# Patient Record
Sex: MALE | Race: WHITE | URBAN - METROPOLITAN AREA
[De-identification: names, ages, dates, MRNs, and addresses within clinical notes are randomized per-mention and may not be internally consistent; named-entity substitution may affect disease eponyms.]

---

## 2017-07-23 ENCOUNTER — HOSPITAL ENCOUNTER (INPATIENT)
Facility: CLINIC | Age: 37
LOS: 8 days | Discharge: HOME | DRG: 439 | End: 2017-07-31
Attending: EMERGENCY MEDICINE | Admitting: HOSPITALIST

## 2017-07-23 ENCOUNTER — APPOINTMENT (OUTPATIENT)
Dept: CT IMAGING | Facility: CLINIC | Age: 37
DRG: 439 | End: 2017-07-23
Attending: EMERGENCY MEDICINE

## 2017-07-23 ENCOUNTER — APPOINTMENT (OUTPATIENT)
Dept: ULTRASOUND IMAGING | Facility: CLINIC | Age: 37
DRG: 439 | End: 2017-07-23
Attending: EMERGENCY MEDICINE

## 2017-07-23 DIAGNOSIS — F41.1 GAD (GENERALIZED ANXIETY DISORDER): ICD-10-CM

## 2017-07-23 DIAGNOSIS — F43.22 ADJUSTMENT DISORDER WITH ANXIOUS MOOD: Primary | ICD-10-CM

## 2017-07-23 DIAGNOSIS — I10 ESSENTIAL HYPERTENSION, BENIGN: ICD-10-CM

## 2017-07-23 DIAGNOSIS — R10.13 ABDOMINAL PAIN, EPIGASTRIC: ICD-10-CM

## 2017-07-23 DIAGNOSIS — K59.03 DRUG-INDUCED CONSTIPATION: ICD-10-CM

## 2017-07-23 DIAGNOSIS — K85.90 ACUTE PANCREATITIS WITHOUT INFECTION OR NECROSIS, UNSPECIFIED PANCREATITIS TYPE: ICD-10-CM

## 2017-07-23 PROBLEM — K85.20 ACUTE ALCOHOLIC PANCREATITIS: Status: ACTIVE | Noted: 2017-07-23

## 2017-07-23 LAB
ALBUMIN SERPL-MCNC: 3.8 G/DL (ref 3.4–5)
ALP SERPL-CCNC: 151 U/L (ref 40–150)
ALT SERPL W P-5'-P-CCNC: 175 U/L (ref 0–70)
ANION GAP SERPL CALCULATED.3IONS-SCNC: 19 MMOL/L (ref 3–14)
ANION GAP SERPL CALCULATED.3IONS-SCNC: 9 MMOL/L (ref 3–14)
AST SERPL W P-5'-P-CCNC: 334 U/L (ref 0–45)
BILIRUB SERPL-MCNC: 0.9 MG/DL (ref 0.2–1.3)
BUN SERPL-MCNC: 12 MG/DL (ref 7–30)
BUN SERPL-MCNC: 9 MG/DL (ref 7–30)
CALCIUM SERPL-MCNC: 7.7 MG/DL (ref 8.5–10.1)
CALCIUM SERPL-MCNC: 8.3 MG/DL (ref 8.5–10.1)
CHLORIDE SERPL-SCNC: 100 MMOL/L (ref 94–109)
CHLORIDE SERPL-SCNC: 104 MMOL/L (ref 94–109)
CO2 SERPL-SCNC: 16 MMOL/L (ref 20–32)
CO2 SERPL-SCNC: 23 MMOL/L (ref 20–32)
CREAT SERPL-MCNC: 0.85 MG/DL (ref 0.66–1.25)
CREAT SERPL-MCNC: 0.9 MG/DL (ref 0.66–1.25)
ERYTHROCYTE [DISTWIDTH] IN BLOOD BY AUTOMATED COUNT: 14.3 % (ref 10–15)
ETHANOL SERPL-MCNC: 0.1 G/DL
GFR SERPL CREATININE-BSD FRML MDRD: ABNORMAL ML/MIN/1.7M2
GFR SERPL CREATININE-BSD FRML MDRD: ABNORMAL ML/MIN/1.7M2
GLUCOSE SERPL-MCNC: 134 MG/DL (ref 70–99)
GLUCOSE SERPL-MCNC: 135 MG/DL (ref 70–99)
HCT VFR BLD AUTO: 43.9 % (ref 40–53)
HGB BLD-MCNC: 15.7 G/DL (ref 13.3–17.7)
LACTATE BLD-SCNC: 1.3 MMOL/L (ref 0.7–2.1)
LACTATE BLD-SCNC: 1.6 MMOL/L (ref 0.7–2.1)
LIPASE SERPL-CCNC: 3648 U/L (ref 73–393)
MAGNESIUM SERPL-MCNC: 1.8 MG/DL (ref 1.6–2.3)
MCH RBC QN AUTO: 33 PG (ref 26.5–33)
MCHC RBC AUTO-ENTMCNC: 35.8 G/DL (ref 31.5–36.5)
MCV RBC AUTO: 92 FL (ref 78–100)
PLATELET # BLD AUTO: 196 10E9/L (ref 150–450)
POTASSIUM SERPL-SCNC: 3.6 MMOL/L (ref 3.4–5.3)
POTASSIUM SERPL-SCNC: 4.1 MMOL/L (ref 3.4–5.3)
PROT SERPL-MCNC: 7.9 G/DL (ref 6.8–8.8)
RBC # BLD AUTO: 4.76 10E12/L (ref 4.4–5.9)
SODIUM SERPL-SCNC: 135 MMOL/L (ref 133–144)
SODIUM SERPL-SCNC: 136 MMOL/L (ref 133–144)
WBC # BLD AUTO: 13.8 10E9/L (ref 4–11)

## 2017-07-23 PROCEDURE — 25000128 H RX IP 250 OP 636: Performed by: INTERNAL MEDICINE

## 2017-07-23 PROCEDURE — 25000125 ZZHC RX 250: Performed by: INTERNAL MEDICINE

## 2017-07-23 PROCEDURE — 25000128 H RX IP 250 OP 636: Performed by: HOSPITALIST

## 2017-07-23 PROCEDURE — 76705 ECHO EXAM OF ABDOMEN: CPT

## 2017-07-23 PROCEDURE — 80320 DRUG SCREEN QUANTALCOHOLS: CPT | Performed by: EMERGENCY MEDICINE

## 2017-07-23 PROCEDURE — 83735 ASSAY OF MAGNESIUM: CPT | Performed by: HOSPITALIST

## 2017-07-23 PROCEDURE — 85027 COMPLETE CBC AUTOMATED: CPT | Performed by: EMERGENCY MEDICINE

## 2017-07-23 PROCEDURE — 36415 COLL VENOUS BLD VENIPUNCTURE: CPT | Performed by: HOSPITALIST

## 2017-07-23 PROCEDURE — 83735 ASSAY OF MAGNESIUM: CPT | Performed by: EMERGENCY MEDICINE

## 2017-07-23 PROCEDURE — 99285 EMERGENCY DEPT VISIT HI MDM: CPT | Mod: 25

## 2017-07-23 PROCEDURE — 96374 THER/PROPH/DIAG INJ IV PUSH: CPT

## 2017-07-23 PROCEDURE — 25000125 ZZHC RX 250: Performed by: HOSPITALIST

## 2017-07-23 PROCEDURE — 80048 BASIC METABOLIC PNL TOTAL CA: CPT | Performed by: HOSPITALIST

## 2017-07-23 PROCEDURE — 99223 1ST HOSP IP/OBS HIGH 75: CPT | Mod: AI | Performed by: HOSPITALIST

## 2017-07-23 PROCEDURE — 12000000 ZZH R&B MED SURG/OB

## 2017-07-23 PROCEDURE — 80053 COMPREHEN METABOLIC PANEL: CPT | Performed by: EMERGENCY MEDICINE

## 2017-07-23 PROCEDURE — HZ2ZZZZ DETOXIFICATION SERVICES FOR SUBSTANCE ABUSE TREATMENT: ICD-10-PCS | Performed by: EMERGENCY MEDICINE

## 2017-07-23 PROCEDURE — 83605 ASSAY OF LACTIC ACID: CPT | Performed by: HOSPITALIST

## 2017-07-23 PROCEDURE — 83690 ASSAY OF LIPASE: CPT | Performed by: EMERGENCY MEDICINE

## 2017-07-23 PROCEDURE — 74177 CT ABD & PELVIS W/CONTRAST: CPT

## 2017-07-23 PROCEDURE — 96361 HYDRATE IV INFUSION ADD-ON: CPT

## 2017-07-23 PROCEDURE — 96375 TX/PRO/DX INJ NEW DRUG ADDON: CPT

## 2017-07-23 PROCEDURE — S0028 INJECTION, FAMOTIDINE, 20 MG: HCPCS | Performed by: HOSPITALIST

## 2017-07-23 PROCEDURE — 25000128 H RX IP 250 OP 636: Performed by: EMERGENCY MEDICINE

## 2017-07-23 PROCEDURE — 93005 ELECTROCARDIOGRAM TRACING: CPT

## 2017-07-23 PROCEDURE — 96376 TX/PRO/DX INJ SAME DRUG ADON: CPT

## 2017-07-23 RX ORDER — IBUPROFEN 200 MG
200 TABLET ORAL EVERY 4 HOURS PRN
Status: ON HOLD | COMMUNITY
End: 2017-07-31

## 2017-07-23 RX ORDER — NALOXONE HYDROCHLORIDE 0.4 MG/ML
.1-.4 INJECTION, SOLUTION INTRAMUSCULAR; INTRAVENOUS; SUBCUTANEOUS
Status: DISCONTINUED | OUTPATIENT
Start: 2017-07-23 | End: 2017-07-27

## 2017-07-23 RX ORDER — POTASSIUM CHLORIDE 1.5 G/1.58G
20-40 POWDER, FOR SOLUTION ORAL
Status: DISCONTINUED | OUTPATIENT
Start: 2017-07-23 | End: 2017-07-31 | Stop reason: HOSPADM

## 2017-07-23 RX ORDER — ONDANSETRON 2 MG/ML
8 INJECTION INTRAMUSCULAR; INTRAVENOUS ONCE
Status: COMPLETED | OUTPATIENT
Start: 2017-07-23 | End: 2017-07-23

## 2017-07-23 RX ORDER — HALOPERIDOL 5 MG/ML
5 INJECTION INTRAMUSCULAR ONCE
Status: COMPLETED | OUTPATIENT
Start: 2017-07-23 | End: 2017-07-23

## 2017-07-23 RX ORDER — ONDANSETRON 4 MG/1
4 TABLET, ORALLY DISINTEGRATING ORAL EVERY 6 HOURS PRN
Status: DISCONTINUED | OUTPATIENT
Start: 2017-07-23 | End: 2017-07-23

## 2017-07-23 RX ORDER — LORAZEPAM 1 MG/1
1-2 TABLET ORAL EVERY 30 MIN PRN
Status: DISCONTINUED | OUTPATIENT
Start: 2017-07-23 | End: 2017-07-26

## 2017-07-23 RX ORDER — MAGNESIUM SULFATE HEPTAHYDRATE 40 MG/ML
4 INJECTION, SOLUTION INTRAVENOUS EVERY 4 HOURS PRN
Status: DISCONTINUED | OUTPATIENT
Start: 2017-07-23 | End: 2017-07-31 | Stop reason: HOSPADM

## 2017-07-23 RX ORDER — HYDROMORPHONE HYDROCHLORIDE 1 MG/ML
0.5 INJECTION, SOLUTION INTRAMUSCULAR; INTRAVENOUS; SUBCUTANEOUS
Status: DISCONTINUED | OUTPATIENT
Start: 2017-07-23 | End: 2017-07-23

## 2017-07-23 RX ORDER — ONDANSETRON 2 MG/ML
4 INJECTION INTRAMUSCULAR; INTRAVENOUS EVERY 6 HOURS PRN
Status: DISCONTINUED | OUTPATIENT
Start: 2017-07-23 | End: 2017-07-23

## 2017-07-23 RX ORDER — LORAZEPAM 2 MG/ML
1-2 INJECTION INTRAMUSCULAR EVERY 30 MIN PRN
Status: DISCONTINUED | OUTPATIENT
Start: 2017-07-23 | End: 2017-07-26

## 2017-07-23 RX ORDER — DIPHENHYDRAMINE HYDROCHLORIDE 50 MG/ML
25 INJECTION INTRAMUSCULAR; INTRAVENOUS EVERY 6 HOURS PRN
Status: DISCONTINUED | OUTPATIENT
Start: 2017-07-23 | End: 2017-07-31 | Stop reason: HOSPADM

## 2017-07-23 RX ORDER — ONDANSETRON 2 MG/ML
4 INJECTION INTRAMUSCULAR; INTRAVENOUS EVERY 6 HOURS PRN
Status: DISCONTINUED | OUTPATIENT
Start: 2017-07-23 | End: 2017-07-27

## 2017-07-23 RX ORDER — POTASSIUM CL/LIDO/0.9 % NACL 10MEQ/0.1L
10 INTRAVENOUS SOLUTION, PIGGYBACK (ML) INTRAVENOUS
Status: DISCONTINUED | OUTPATIENT
Start: 2017-07-23 | End: 2017-07-31 | Stop reason: HOSPADM

## 2017-07-23 RX ORDER — KETAMINE HYDROCHLORIDE 10 MG/ML
9 INJECTION, SOLUTION INTRAMUSCULAR; INTRAVENOUS ONCE
Status: DISCONTINUED | OUTPATIENT
Start: 2017-07-23 | End: 2017-07-23

## 2017-07-23 RX ORDER — POTASSIUM CHLORIDE 29.8 MG/ML
20 INJECTION INTRAVENOUS
Status: DISCONTINUED | OUTPATIENT
Start: 2017-07-23 | End: 2017-07-31 | Stop reason: HOSPADM

## 2017-07-23 RX ORDER — ONDANSETRON 4 MG/1
4 TABLET, ORALLY DISINTEGRATING ORAL EVERY 6 HOURS PRN
Status: DISCONTINUED | OUTPATIENT
Start: 2017-07-23 | End: 2017-07-27

## 2017-07-23 RX ORDER — POTASSIUM CHLORIDE 1500 MG/1
20-40 TABLET, EXTENDED RELEASE ORAL
Status: DISCONTINUED | OUTPATIENT
Start: 2017-07-23 | End: 2017-07-31 | Stop reason: HOSPADM

## 2017-07-23 RX ORDER — POTASSIUM CHLORIDE 7.45 MG/ML
10 INJECTION INTRAVENOUS
Status: DISCONTINUED | OUTPATIENT
Start: 2017-07-23 | End: 2017-07-31 | Stop reason: HOSPADM

## 2017-07-23 RX ORDER — SODIUM CHLORIDE, SODIUM LACTATE, POTASSIUM CHLORIDE, CALCIUM CHLORIDE 600; 310; 30; 20 MG/100ML; MG/100ML; MG/100ML; MG/100ML
INJECTION, SOLUTION INTRAVENOUS CONTINUOUS
Status: ACTIVE | OUTPATIENT
Start: 2017-07-23 | End: 2017-07-24

## 2017-07-23 RX ORDER — DIPHENHYDRAMINE HCL 25 MG
25 CAPSULE ORAL EVERY 6 HOURS PRN
Status: DISCONTINUED | OUTPATIENT
Start: 2017-07-23 | End: 2017-07-31 | Stop reason: HOSPADM

## 2017-07-23 RX ORDER — NALOXONE HYDROCHLORIDE 0.4 MG/ML
.1-.4 INJECTION, SOLUTION INTRAMUSCULAR; INTRAVENOUS; SUBCUTANEOUS
Status: DISCONTINUED | OUTPATIENT
Start: 2017-07-23 | End: 2017-07-23

## 2017-07-23 RX ORDER — IOPAMIDOL 755 MG/ML
500 INJECTION, SOLUTION INTRAVASCULAR ONCE
Status: COMPLETED | OUTPATIENT
Start: 2017-07-23 | End: 2017-07-23

## 2017-07-23 RX ADMIN — CALCIUM CHLORIDE 1 G: 100 INJECTION, SOLUTION INTRAVENOUS at 21:54

## 2017-07-23 RX ADMIN — HYDROMORPHONE HYDROCHLORIDE 1 MG: 1 INJECTION, SOLUTION INTRAMUSCULAR; INTRAVENOUS; SUBCUTANEOUS at 08:59

## 2017-07-23 RX ADMIN — Medication 0.5 MG: at 10:24

## 2017-07-23 RX ADMIN — IOPAMIDOL 100 ML: 755 INJECTION, SOLUTION INTRAVENOUS at 10:58

## 2017-07-23 RX ADMIN — HALOPERIDOL LACTATE 5 MG: 5 INJECTION, SOLUTION INTRAMUSCULAR at 09:36

## 2017-07-23 RX ADMIN — SODIUM CHLORIDE, POTASSIUM CHLORIDE, SODIUM LACTATE AND CALCIUM CHLORIDE: 600; 310; 30; 20 INJECTION, SOLUTION INTRAVENOUS at 16:18

## 2017-07-23 RX ADMIN — SODIUM CHLORIDE 1000 ML: 9 INJECTION, SOLUTION INTRAVENOUS at 21:54

## 2017-07-23 RX ADMIN — Medication 0.5 MG: at 13:03

## 2017-07-23 RX ADMIN — FAMOTIDINE 20 MG: 10 INJECTION, SOLUTION INTRAVENOUS at 16:18

## 2017-07-23 RX ADMIN — SODIUM CHLORIDE, POTASSIUM CHLORIDE, SODIUM LACTATE AND CALCIUM CHLORIDE: 600; 310; 30; 20 INJECTION, SOLUTION INTRAVENOUS at 22:55

## 2017-07-23 RX ADMIN — SODIUM CHLORIDE 65 ML: 9 INJECTION, SOLUTION INTRAVENOUS at 10:58

## 2017-07-23 RX ADMIN — SODIUM CHLORIDE, POTASSIUM CHLORIDE, SODIUM LACTATE AND CALCIUM CHLORIDE: 600; 310; 30; 20 INJECTION, SOLUTION INTRAVENOUS at 20:24

## 2017-07-23 RX ADMIN — SODIUM CHLORIDE 1000 ML: 9 INJECTION, SOLUTION INTRAVENOUS at 08:59

## 2017-07-23 RX ADMIN — HYDROMORPHONE HYDROCHLORIDE: 10 INJECTION, SOLUTION INTRAMUSCULAR; INTRAVENOUS; SUBCUTANEOUS at 15:11

## 2017-07-23 RX ADMIN — ONDANSETRON 8 MG: 2 INJECTION INTRAMUSCULAR; INTRAVENOUS at 08:59

## 2017-07-23 ASSESSMENT — ACTIVITIES OF DAILY LIVING (ADL)
AMBULATION: 0-->INDEPENDENT
SWALLOWING: 0-->SWALLOWS FOODS/LIQUIDS WITHOUT DIFFICULTY
FALL_HISTORY_WITHIN_LAST_SIX_MONTHS: NO
COGNITION: 0 - NO COGNITION ISSUES REPORTED
TRANSFERRING: 0-->INDEPENDENT
TOILETING: 0-->INDEPENDENT
BATHING: 0-->INDEPENDENT
DRESS: 0-->INDEPENDENT
RETIRED_COMMUNICATION: 0-->UNDERSTANDS/COMMUNICATES WITHOUT DIFFICULTY
RETIRED_EATING: 0-->INDEPENDENT

## 2017-07-23 ASSESSMENT — ENCOUNTER SYMPTOMS
FEVER: 0
NAUSEA: 1
BACK PAIN: 1
DIARRHEA: 0
ABDOMINAL PAIN: 1
VOMITING: 0

## 2017-07-23 NOTE — PLAN OF CARE
Problem: Goal Outcome Summary  Goal: Goal Outcome Summary  Outcome: No Change  Patient admitted from ER around 1400 for pancreatitis. To be initiated on Dilaudid PCA. IVF started. Patient to be NPO - education given on purpose of NPO status. Denies nausea at this time. Lipase 3648. BP and HR elevated. Initiated on telemetry monitoring. Ambulating with standby assist.

## 2017-07-23 NOTE — LETTER
2017        Yovani Servin  [address] [place] [address]  [phone] (home)    :     1980          To Whom it May Concern:    Mr. Yovani Servin  was hospitalized from 17 to 2017 at [facility] in [place], [place].  He is being discharged home today and has requested I provide this letter stating the dates during which he was hospitalized.    Sincerely,    Ganesh Douglas MD  Internal Medicine  [facility]

## 2017-07-23 NOTE — ED PROVIDER NOTES
"  History     Chief Complaint:  Abdominal Pain      HPI   Yovani Servin is a 36 year old male with a history of appendicitis who presents to the emergency department today for evaluation of abdominal pain. The patient had 5-6 episode of yellow colored non bloody vomiting yesterday. He felt dehydrated prior to going to bed however patient had no abdominal pain last night. Patient woke up at 0430 today with constant mid epigastric pain that radiates to his mid to lower back. Pain has worsened since onset therefore prompting him to visit the ED. Here, patient has never had pain like this before. He states it feels as if his abdomen is \"locked up and swelled.\" Pain is exacerbated with movement and touch. No alleviating factors. He has associated nausea but no vomiting today. [place] and he reports drinking 6-8 beers per day. He has not urinated since yesterday and his last urine output was clear. No fevers. No change in bowel habits. No history of abdominal surgery except for an appendectomy.       Allergies:  Morphine- Itchy     Medications:    The patient is currently on no regular medications.     Past Medical History:    Appendicitis     Past Surgical History:    Appendectomy     Family History:    History reviewed. No pertinent family history.     Social History:  The patient was accompanied to the ED by friend.  Alcohol Use: Yes  Marital Status:   [4]     Review of Systems   Constitutional: Negative for fever.   Gastrointestinal: Positive for abdominal pain and nausea. Negative for diarrhea and vomiting.   Musculoskeletal: Positive for back pain.   All other systems reviewed and are negative.    Physical Exam   Vitals:  Patient Vitals for the past 24 hrs:   BP Temp Temp src Heart Rate Resp SpO2   07/23/17 0848 (!) 154/104 98.5  F (36.9  C) Oral 102 18 98 %       Physical Exam    Constitutional:  Pleasant, age appropriate male writhing in the bed with " overt pain.    Eyes:    Conjunctiva normal  Neck:    Supple, no meningismus.     CV:     Regular rate and rhythm.      No murmurs, rubs or gallops.     No lower extremity edema.  PULM:    Clear to auscultation bilateral.       No respiratory distress.      Good air exchange.     No rales or wheezing.  ABD:    Soft, non-distended.       Severe tenderness in the epigastrium; moderate tenderness throughout.      Pain out of proportion to exam     Diffuse guarding     Bowel sounds normal.     No pulsatile masses.       No rebound or rigidity.     No CVA tenderness.   MSK:     No gross deformity to all four extremities.   LYMPH:   No cervical lymphadenopathy.  NEURO:   Alert.  Good muscular tone, no atrophy.   Skin:    Warm, dry and intact.    Psych:    Mildly anxious      Emergency Department Course     ECG:  ECG taken at 0926, ECG read at 0930  Sinus tachycardia  Otherwise normal ECG  Rate 105 bpm. NH interval 134. QRS duration 70. QT/QTc 360/475. P-R-T axes 53 63 32.      Imaging:  Radiology findings were communicated with the patient who voiced understanding of the findings.    CT Abdomen Pelvis w/ Contrast:  1. Peripancreatic and right paracolic stranding as well as hypodensity  within the pancreatic head. These findings suggest pancreatitis.  2. Severe hepatic fatty infiltration.  Reading per radiology    Abdomen US, Limited (RUQ only):  No cholelithiasis or biliary dilatation. Midline abdominal  fluid collection of indeterminate etiology or significance.  Reading per radiology      Laboratory:  Laboratory findings were communicated with the patient who voiced understanding of the findings.    CBC: WBC: 13.8(H) o/w WNL. (HGB 15.7, )     CMP: Glucose: 134(H), Calcium: 8.3(L), Alkphos: 151(H), ALT: 175(H), AST: 334(H) w/o WNL (Creatinine: 0.90)    Lipase: 3648(H)    Alcohol ethyl level: 0.10(H)    Interventions:  0859- NS 1000 mL IV   0859- Zofran 8 mg IV  0859- Dilaudid 1 mg IV  0936- Haldol 5 mg IV   1024-  Dilaudid 0.5 mg IV   1105- NS 65 mL IV        Emergency Department Course:  Nursing notes and vitals reviewed.  I performed an exam of the patient as documented above.       IV was inserted and blood was drawn for laboratory testing, results above.    The patient was sent for a CT and US while in the emergency department, results above.     I discussed the treatment plan with the patient. They expressed understanding of this plan and consented to admission. I discussed the patient with Dr. Arroyo, who will admit the patient to a monitored bed for further evaluation and treatment.    I personally reviewed the laboratory results with the Patient and answered all related questions prior to admission.    Impression & Plan      Medical Decision Making:  Yovani Servin is a 36 year old male who presents to the emergency department with progressively worsening epigastric pain.  Clinical examination revealed that he had pain out of proportion to examination concerning for pancreatitis. Lipase was remarkably elevated at 3648. There was associated elevation of LFTs. RUQ ultrasound revealed no evidence biliary disease but there was concern for large fluid collection. CT scan was done which revealed no fluid collection and findings consistent with pancreatitis alone. The cause of his pancreatitis is likely alcohol induced. Patient's pain is well controlled but he is certainly unfit to go home. He will be transferred to medical bed for ongoing treatment and care.         Diagnosis:    ICD-10-CM    1. Acute pancreatitis without infection or necrosis, unspecified pancreatitis type K85.90 Alcohol ethyl     Alcohol ethyl     CANCELED: Alcohol level blood         Disposition:   The patient was admitted.      Scribe Disclosure:  Jordon MCCORMACK, am serving as a scribe at 8:58 AM on 7/23/2017 to document services personally performed by Tho Albarran MD, based on my observations and the provider's statements to  me.    7/23/2017   [facility] EMERGENCY DEPARTMENT       Tho Albarran MD  07/23/17 6083

## 2017-07-23 NOTE — PROVIDER NOTIFICATION
Dr Arroyo updated: Pt reports having loose stools for 4 days with nausea times three days. No active bowel sounds currently. Last BM yesterday. Denies passing flatus.

## 2017-07-23 NOTE — PHARMACY-ADMISSION MEDICATION HISTORY
Admission medication history interview status for this patient is complete. See [facility] admission navigator for allergy information, prior to admission medications and immunization status.     Medication history interview source(s):Patient  Medication history resources (including written lists, pill bottles, clinic record):None  Primary pharmacy:[facility] [id number]    Changes made to PTA medication list:  Added(2): Melatonin, Advil  Deleted(0): None  Changed(0): None    Actions taken by pharmacist (provider contacted, etc):None     Additional medication history information:None    Medication reconciliation/reorder completed by provider prior to medication history? No    Do you take OTC medications (eg tylenol, ibuprofen, fish oil, eye/ear drops, etc)? Yes    For patients on insulin therapy: No    Prior to Admission medications    Medication Sig Last Dose Taking? Auth Provider   melatonin 5 MG tablet Take 10 mg by mouth nightly as needed for sleep 7/21/2017 at Bedtime Yes Unknown, Entered By History   ibuprofen (ADVIL) 200 MG tablet Take 200 mg by mouth every 4 hours as needed for mild pain Past Week at N/A Yes Unknown, Entered By History

## 2017-07-23 NOTE — ED NOTES
"Patient with call light on, requesting more pain medication. States the pain is not better, in fact it's worse. He rates it as 9/10. Asking for \"something to drink\", declined and reasoning given. Drew (his nurse) and Dr Albarran notified of above.  "

## 2017-07-23 NOTE — ED NOTES
"Patient states that he is \"still having pain\" and \"pain medications isn't helping\". MD was made aware and orders for Ketamine where placed. When RN went in to give medication to patient he stated \"I don't want to be sedated\". Patient was educated on the use and action of medication. Patient states that he \"wants to wait and think about it\"  "

## 2017-07-23 NOTE — ED NOTES
Patient oxygen levels dropping to high 80's. Oxygen was applied via NC at 2L. Stats are now in mid 90's

## 2017-07-23 NOTE — IP AVS SNAPSHOT
MRN:[id number]                      After Visit Summary   7/23/2017    Yovani Servin    MRN: [id number]           Thank you!     Thank you for choosing [facility] for your care. Our goal is always to provide you with excellent care. Hearing back from our patients is one way we can continue to improve our services. Please take a few minutes to complete the written survey that you may receive in the mail after you visit. If you would like to speak to someone directly about your visit please contact Patient Relations at [phone]. Thank you!          Patient Information     Date Of Birth          1980        Designated Caregiver       Most Recent Value    Caregiver    Will someone help with your care after discharge? yes    Name of designated caregiver mother Roberts    Phone number of caregiver [phone]    Caregiver address N/A      About your hospital stay     You were admitted on:  July 23, 2017 You last received care in the:  [facility] [address] [facility]    You were discharged on:  July 31, 2017        Reason for your hospital stay       You were admitted for acute pancreatitis due to alcohol abuse.  As discussed, you were treated with IV fluids and pain medication.  You now have been advanced to a low fat diet and we recommend you eat 5-6 small meals per day and maintain good hydration.                  Who to Call     For medical emergencies, please call 911.  For non-urgent questions about your medical care, please call your primary care provider or clinic, None          Attending Provider     Provider Specialty    Tho Albarran MD Emergency Medicine    Mikey Arroyo MD Family Practice       Primary Care Provider    [facility]      After Care Instructions     Activity       Your activity upon discharge: light activity as tolerated until re-evaluated by your new primary care clinic.            Diet       Follow this diet upon discharge:  "Orders Placed This Encounter         Low Fat Diet, 5-6 small feedings per day.  Absolutely no alcohol.                  Follow-up Appointments     Follow-up and recommended labs and tests        Follow up with primary care provider within 3-5 days for hospital follow- up.  The following labs/tests are recommended:   1) Re-evaluate your abdominal pain/pancreatitis.  As discussed, if this is not resolved you should have a repeat CT scan to evaluate for any complications.    2) Re-check your blood pressure and discuss whether tapering off of the metoprolol would be reasonable.    3) Discuss anxiety management and efficacy of your current medications.    4) Establish primary care.    5)  Recheck your lipase and liver function tests.                  Pending Results     No orders found from 7/21/2017 to 7/24/2017.            Statement of Approval     Ordered          07/31/17 1012  I have reviewed and agree with all the recommendations and orders detailed in this document.  EFFECTIVE NOW     Approved and electronically signed by:  Ganesh Douglas MD             Admission Information     Date & Time Department Dept. Phone    7/23/2017 [facility] [address] [facility] [phone]      Your Vitals Were     Blood Pressure Pulse Temperature Respirations Height Weight    135/71 (BP Location: Right arm) 99 99.2  F (37.3  C) (Oral) 16 1.829 m (6') 87.1 kg (192 lb)    Pulse Oximetry BMI (Body Mass Index)                98% 26.04 kg/m2          [facility] Information     [facility] lets you send messages to your doctor, view your test results, renew your prescriptions, schedule appointments and more. To sign up, go to [email or web address] . Click on \"Log in\" on the left side of the screen, which will take you to the Welcome page. Then click on \"Sign up Now\" on the right side of the page.     You will be asked to enter the access code listed below, as well as some personal information. Please follow the directions to " create your username and password.     Your access code is: [email or web address]  Expires: 10/29/2017 10:37 AM     Your access code will  in 90 days. If you need help or a new code, please call your [facility] [facility] clinic or [phone].        Care EveryWhere ID     This is your Care EveryWhere ID. This could be used by other organizations to access your [facility] medical records  [id number]        Equal Access to Services     LORRIE VALERIO : Hadii josué ku hadasho Soomaali, waaxda luqadaha, qaybta kaalmada adeegyada, waxay anthonyin haycarrien nikolas rahmanbasilemy quinteros . So [facility] [phone].    ATENCIÓN: Si habla español, tiene a santacruz disposición servicios gratuitos de asistencia lingüística. Abelardo al [phone].    We comply with applicable federal civil rights laws and [place] laws. We do not discriminate on the basis of race, color, national origin, age, disability sex, sexual orientation or gender identity.               Review of your medicines      START taking        Dose / Directions    hydrOXYzine 25 MG tablet   Commonly known as:  ATARAX   Used for:  Adjustment disorder with anxious mood        Dose:  25 mg   Take 1 tablet (25 mg) by mouth every 6 hours as needed for other (adjuvant pain)   Quantity:  30 tablet   Refills:  0       metoprolol 50 MG tablet   Commonly known as:  LOPRESSOR   Used for:  Essential hypertension, benign        Dose:  25 mg   Take 0.5 tablets (25 mg) by mouth 2 times daily   Quantity:  60 tablet   Refills:  0       oxyCODONE 5 MG IR tablet   Commonly known as:  ROXICODONE   Used for:  Acute pancreatitis without infection or necrosis, unspecified pancreatitis type        Dose:  5-10 mg   Take 1-2 tablets (5-10 mg) by mouth every 4 hours as needed for severe pain   Quantity:  15 tablet   Refills:  0       polyethylene glycol Packet   Commonly known as:  MIRALAX/GLYCOLAX   Used for:  Drug-induced constipation        Dose:  17 g   Take 17 g by mouth 2 times daily as needed (constipation)   Quantity:  14  packet   Refills:  0       QUEtiapine 25 MG tablet   Commonly known as:  SEROquel   Used for:  TATYANA (generalized anxiety disorder)        Dose:  25 mg   Take 1 tablet (25 mg) by mouth nightly as needed (insomnia/anxiety)   Quantity:  14 tablet   Refills:  0       ranitidine 150 MG tablet   Commonly known as:  ZANTAC   Used for:  Abdominal pain, epigastric        Dose:  150 mg   Take 1 tablet (150 mg) by mouth 2 times daily   Quantity:  60 tablet   Refills:  0         CONTINUE these medicines which have NOT CHANGED        Dose / Directions    melatonin 5 MG tablet        Dose:  10 mg   Take 10 mg by mouth nightly as needed for sleep   Refills:  0         STOP taking     ADVIL 200 MG tablet   Generic drug:  ibuprofen                Where to get your medicines      These medications were sent to [place], [place] - [address] [address]  [address] [address] [address]     Phone:  [phone]     hydrOXYzine 25 MG tablet    metoprolol 50 MG tablet    polyethylene glycol Packet    QUEtiapine 25 MG tablet    ranitidine 150 MG tablet         Some of these will need a paper prescription and others can be bought over the counter. Ask your nurse if you have questions.     Bring a paper prescription for each of these medications     oxyCODONE 5 MG IR tablet                Protect others around you: Learn how to safely use, store and throw away your medicines at www.disposemymeds.org.             Medication List: This is a list of all your medications and when to take them. Check marks below indicate your daily home schedule. Keep this list as a reference.      Medications           Morning Afternoon Evening Bedtime As Needed    hydrOXYzine 25 MG tablet   Commonly known as:  ATARAX   Take 1 tablet (25 mg) by mouth every 6 hours as needed for other (adjuvant pain)   Last time this was given:  Not given while in hospital.   Next Dose Due:  Take as needed for pain/anxiety                                    melatonin 5 MG tablet   Take 10 mg by mouth nightly as needed for sleep   Last time this was given:  Not given while in hospital.   Next Dose Due:  Take as needed if Seroquel does not work at bedtime.                                     metoprolol 50 MG tablet   Commonly known as:  LOPRESSOR   Take 0.5 tablets (25 mg) by mouth 2 times daily   Last time this was given:  50 mg on 7/31/2017  8:57 AM   Next Dose Due:  Due tonight at 9:00pm.                                      oxyCODONE 5 MG IR tablet   Commonly known as:  ROXICODONE   Take 1-2 tablets (5-10 mg) by mouth every 4 hours as needed for severe pain   Last time this was given:  10 mg on 7/31/2017 11:50 AM   Next Dose Due:  Must be later than 3:50pm.                                   polyethylene glycol Packet   Commonly known as:  MIRALAX/GLYCOLAX   Take 17 g by mouth 2 times daily as needed (constipation)   Last time this was given:  17 g on 7/30/2017  4:48 PM   Next Dose Due:  Take as needed.                                   QUEtiapine 25 MG tablet   Commonly known as:  SEROquel   Take 1 tablet (25 mg) by mouth nightly as needed (insomnia/anxiety)   Last time this was given:  Not given while in hospital.   Next Dose Due:  Take as needed.                                     ranitidine 150 MG tablet   Commonly known as:  ZANTAC   Take 1 tablet (150 mg) by mouth 2 times daily   Last time this was given:  150 mg on 7/31/2017  8:57 AM   Next Dose Due:  Due tonight at 9:00pm.

## 2017-07-23 NOTE — ED NOTES
Patient is here for evaluation of mid abdominal pain that radiates to mid back and kidneys. Pain has increasing gotten worse. He also notes that he is dehydrated from [place] and drinking and has been unable to pee since yesterday. BM yesterday. AOX4, ABC's  Intact.

## 2017-07-23 NOTE — H&P
[facility]    History and Physical  Hospitalist     Date of Admission:  7/23/2017  Date of Service (when I saw the patient): 07/23/17  Provider: Mikey Arroyo MD      Chief Complaint   Abdomen pain, nausea and vomiting    History is obtained from the patient, electronic health record and emergency department physician    History of Present Illness   Yovani Servin is a 36 year old male, healthy who presents with intense epigastric abdominal pain that started last night.  In the last few day patient had been drinking alcohol.  He states that three days ago he started to have nausea and vomiting.  He has not thrown up since last night.  He has never had a pain as intense as it is.  He rates the pain a 9/10, with brief relief of about a half hour after Dilaudid IV given in the emergency department.  He feels dry and dehydrated.  He denies other symptoms.  His lab workup is significant for leukocytosis, elevated lipase, abnormal liver enzymes including alkaline phosphatase, normal gallbladder ultrasound, evidence of pancreatitis on CT of the abdomen and alcohol level of 0.10 g/DL.    Past Medical History    Healthy, no chronic diseases, not on any medications.    Assessment & Plan   Yovani Servin is a 36 year old male who presents with excruciating epigastric pain, elevated lipase, recent alcohol use and BAL on admission of 0.10 g/dL Abnormal liver enzymes, AGMA and compensatory tachypnea with hypocapnia.  1.  Acute alcoholic pancreatitis.  -Leukocytosis.  -Moderate to severe dehydration.  -Moderate hyperglycemia.  2.  Anion gap metabolic acidosis.  Lactic acid is still in process.  3.  Tachypnea/hypocapnia secondary to #2.  4.  Acute alcoholic hepatitis.    Plan.  Inpatient.  Telemetry  Strict n.p.o.  I and O's, strict.  PCA for pain control.  Treatment of nausea and vomiting per protocol.  Zantac 50 mg q.8 hours IV.  Lactate ringer 200 mL per hour/18 hours  Obtain lactic acid stat,  follow-up at 20 hours tonight.    Repeat BMP at 20 hours tonight.  Lipase and rest of the workup follow-up in a.m.    Code Status   Full Code    Primary Care Physician   Clinic Other      Past Surgical History   I have reviewed this patient's surgical history and updated it with pertinent information if needed.  Past Surgical History:   Procedure Laterality Date     APPENDECTOMY         Prior to Admission Medications   None     Allergies   Allergies   Allergen Reactions     Morphine        Social History   I have personally reviewed the social history with the patient showing he drinks regularly and seldom smokes a cigar.    Family History   I have reviewed this patient's family history and it is non contributory to the admission.       Review of Systems   Ten points ROS done and pertinent as per HPI, otherwise negative.    Physical Exam   Vital Signs with Ranges  Temp:  [98.5  F (36.9  C)] 98.5  F (36.9  C)  Heart Rate:  [102] 102  Resp:  [18] 18  BP: (123-154)/() 149/92  SpO2:  [88 %-98 %] 96 %  0 lbs 0 oz    GEN:  Alert, oriented x 3, appears in pain,  HEENT:  Normocephalic/atraumatic, no scleral icterus, no nasal discharge, mouth mucous is dry .  CV:Sinus tachycardia, rate 100, no murmur or JVD.  S1 + S2 noted, no S3 or S4.  LUNGS:  Tachypnea.  Clear to auscultation bilaterally without rales/rhonchi/wheezing/retractions.  Symmetric chest rise on inhalation noted.  ABD:Hypoactive bowel sounds, firm, tender in mid upper/non-distended.  No rebound/guarding/rigidity.  EXT:  No edema or cyanosis.  No joint synovitis noted.  SKIN:  Dry to touch, no exanthems noted in the visualized areas.       Data   I personally reviewed the EKG tracing showing sinus tachycardia in monitor.  Results for orders placed or performed during the hospital encounter of 07/23/17 (from the past 24 hour(s))   CBC (platelets, no diff)   Result Value Ref Range    WBC 13.8 (H) 4.0 - 11.0 10e9/L    RBC Count 4.76 4.4 - 5.9 10e12/L     Hemoglobin 15.7 13.3 - 17.7 g/dL    Hematocrit 43.9 40.0 - 53.0 %    MCV 92 78 - 100 fl    MCH 33.0 26.5 - 33.0 pg    MCHC 35.8 31.5 - 36.5 g/dL    RDW 14.3 10.0 - 15.0 %    Platelet Count 196 150 - 450 10e9/L   Comprehensive metabolic panel   Result Value Ref Range    Sodium 135 133 - 144 mmol/L    Potassium 3.6 3.4 - 5.3 mmol/L    Chloride 100 94 - 109 mmol/L    Carbon Dioxide 16 (L) 20 - 32 mmol/L    Anion Gap 19 (H) 3 - 14 mmol/L    Glucose 134 (H) 70 - 99 mg/dL    Urea Nitrogen 12 7 - 30 mg/dL    Creatinine 0.90 0.66 - 1.25 mg/dL    GFR Estimate >90  Non  GFR Calc   >60 mL/min/1.7m2    GFR Estimate If Black >90   GFR Calc   >60 mL/min/1.7m2    Calcium 8.3 (L) 8.5 - 10.1 mg/dL    Bilirubin Total 0.9 0.2 - 1.3 mg/dL    Albumin 3.8 3.4 - 5.0 g/dL    Protein Total 7.9 6.8 - 8.8 g/dL    Alkaline Phosphatase 151 (H) 40 - 150 U/L     (H) 0 - 70 U/L     (H) 0 - 45 U/L   Lipase   Result Value Ref Range    Lipase 3648 (H) 73 - 393 U/L   Alcohol ethyl   Result Value Ref Range    Ethanol g/dL 0.10 (H) <0.01 g/dL   EKG 12 lead   Result Value Ref Range    Interpretation ECG Click View Image link to view waveform and result    Abdomen US, limited (RUQ only)    Narrative    ULTRASOUND ABDOMEN LIMITED July 23, 2017 10:18 AM     HISTORY: Upper abdominal pain; abnormal liver function tests.    FINDINGS: No cholelithiasis or apparent gallbladder wall thickening.  There is no apparent biliary dilatation. There is an 11.9 x 4.1 x 4.1  cm fluid collection at the midline of the upper abdomen of  indeterminate etiology or significance. Increased hepatic echotexture  suggests fatty infiltration. The right kidney appears within normal  limits. The pancreas is completely obscured by bowel gas.      Impression    IMPRESSION: No cholelithiasis or biliary dilatation. Midline abdominal  fluid collection of indeterminate etiology or significance.    DENISE ELIAS MD   CT Abdomen Pelvis w Contrast     Narrative    CT ABDOMEN/PELVIS WITH CONTRAST July 23, 2017 11:10 AM     HISTORY: Pancreatitis; abnormal fluid collection on ultrasound.    CONTRAST DOSE: 100mL Isovue-370.    TECHNIQUE: Radiation dose for this scan was reduced using automated  exposure control, adjustment of the mA and/or kV according to patient  size, or iterative reconstruction technique.    FINDINGS: There is severe hepatic fatty infiltration. Peripancreatic  stranding is noted adjacent to the pancreatic head. Hypodensity is  also noted within the pancreatic head. The adjacent portion of the  duodenum may be thickened. No discrete soft tissue fluid collection is  visible. The spleen, gallbladder, adrenal glands, and kidneys appear  within normal limits. Stranding is noted along the right paracolic  gutter. There is no evidence of bowel obstruction. No free peritoneal  fluid or air. Pelvic contents appear within normal limits.      Impression    IMPRESSION:  1. Peripancreatic and right paracolic stranding as well as hypodensity  within the pancreatic head. These findings suggest pancreatitis.  2. Severe hepatic fatty infiltration.    DENISE ELIAS MD         Disclaimer: This note consists of symbols derived from keyboarding, dictation and/or voice recognition software. As a result, there may be errors in the script that have gone undetected. Please consider this when interpreting information found in this chart.

## 2017-07-24 LAB
ALBUMIN SERPL-MCNC: 2.8 G/DL (ref 3.4–5)
ALP SERPL-CCNC: 104 U/L (ref 40–150)
ALT SERPL W P-5'-P-CCNC: 95 U/L (ref 0–70)
ANION GAP SERPL CALCULATED.3IONS-SCNC: 8 MMOL/L (ref 3–14)
AST SERPL W P-5'-P-CCNC: 114 U/L (ref 0–45)
BASOPHILS # BLD AUTO: 0 10E9/L (ref 0–0.2)
BASOPHILS NFR BLD AUTO: 0.3 %
BILIRUB SERPL-MCNC: 1.7 MG/DL (ref 0.2–1.3)
BUN SERPL-MCNC: 7 MG/DL (ref 7–30)
CALCIUM SERPL-MCNC: 8.3 MG/DL (ref 8.5–10.1)
CHLORIDE SERPL-SCNC: 104 MMOL/L (ref 94–109)
CO2 SERPL-SCNC: 23 MMOL/L (ref 20–32)
CREAT SERPL-MCNC: 0.86 MG/DL (ref 0.66–1.25)
CRP SERPL-MCNC: 233 MG/L (ref 0–8)
DIFFERENTIAL METHOD BLD: ABNORMAL
EOSINOPHIL # BLD AUTO: 0 10E9/L (ref 0–0.7)
EOSINOPHIL NFR BLD AUTO: 0.1 %
ERYTHROCYTE [DISTWIDTH] IN BLOOD BY AUTOMATED COUNT: 14.6 % (ref 10–15)
ERYTHROCYTE [SEDIMENTATION RATE] IN BLOOD BY WESTERGREN METHOD: 14 MM/H (ref 0–15)
GFR SERPL CREATININE-BSD FRML MDRD: ABNORMAL ML/MIN/1.7M2
GLUCOSE SERPL-MCNC: 110 MG/DL (ref 70–99)
HCT VFR BLD AUTO: 42.4 % (ref 40–53)
HGB BLD-MCNC: 14.3 G/DL (ref 13.3–17.7)
IMM GRANULOCYTES # BLD: 0.1 10E9/L (ref 0–0.4)
IMM GRANULOCYTES NFR BLD: 0.5 %
INTERPRETATION ECG - MUSE: NORMAL
LIPASE SERPL-CCNC: 964 U/L (ref 73–393)
LYMPHOCYTES # BLD AUTO: 0.9 10E9/L (ref 0.8–5.3)
LYMPHOCYTES NFR BLD AUTO: 9.1 %
MCH RBC QN AUTO: 32.1 PG (ref 26.5–33)
MCHC RBC AUTO-ENTMCNC: 33.7 G/DL (ref 31.5–36.5)
MCV RBC AUTO: 95 FL (ref 78–100)
MONOCYTES # BLD AUTO: 0.7 10E9/L (ref 0–1.3)
MONOCYTES NFR BLD AUTO: 7.2 %
NEUTROPHILS # BLD AUTO: 8.4 10E9/L (ref 1.6–8.3)
NEUTROPHILS NFR BLD AUTO: 82.8 %
NRBC # BLD AUTO: 0 10*3/UL
NRBC BLD AUTO-RTO: 0 /100
PLATELET # BLD AUTO: 121 10E9/L (ref 150–450)
POTASSIUM SERPL-SCNC: 3.8 MMOL/L (ref 3.4–5.3)
PROT SERPL-MCNC: 6.8 G/DL (ref 6.8–8.8)
RBC # BLD AUTO: 4.46 10E12/L (ref 4.4–5.9)
SODIUM SERPL-SCNC: 135 MMOL/L (ref 133–144)
WBC # BLD AUTO: 10.1 10E9/L (ref 4–11)

## 2017-07-24 PROCEDURE — 25000128 H RX IP 250 OP 636: Performed by: INTERNAL MEDICINE

## 2017-07-24 PROCEDURE — 85652 RBC SED RATE AUTOMATED: CPT | Performed by: HOSPITALIST

## 2017-07-24 PROCEDURE — 80053 COMPREHEN METABOLIC PANEL: CPT | Performed by: HOSPITALIST

## 2017-07-24 PROCEDURE — 25000125 ZZHC RX 250: Performed by: HOSPITALIST

## 2017-07-24 PROCEDURE — 99233 SBSQ HOSP IP/OBS HIGH 50: CPT | Performed by: HOSPITALIST

## 2017-07-24 PROCEDURE — 12000000 ZZH R&B MED SURG/OB

## 2017-07-24 PROCEDURE — 25000125 ZZHC RX 250: Performed by: INTERNAL MEDICINE

## 2017-07-24 PROCEDURE — 83690 ASSAY OF LIPASE: CPT | Performed by: HOSPITALIST

## 2017-07-24 PROCEDURE — 36415 COLL VENOUS BLD VENIPUNCTURE: CPT | Performed by: HOSPITALIST

## 2017-07-24 PROCEDURE — S0028 INJECTION, FAMOTIDINE, 20 MG: HCPCS | Performed by: HOSPITALIST

## 2017-07-24 PROCEDURE — 25000128 H RX IP 250 OP 636: Performed by: HOSPITALIST

## 2017-07-24 PROCEDURE — 86140 C-REACTIVE PROTEIN: CPT | Performed by: HOSPITALIST

## 2017-07-24 PROCEDURE — 85025 COMPLETE CBC W/AUTO DIFF WBC: CPT | Performed by: HOSPITALIST

## 2017-07-24 RX ORDER — METOPROLOL TARTRATE 1 MG/ML
10 INJECTION, SOLUTION INTRAVENOUS EVERY 6 HOURS
Status: DISCONTINUED | OUTPATIENT
Start: 2017-07-24 | End: 2017-07-27

## 2017-07-24 RX ORDER — METOPROLOL TARTRATE 25 MG/1
25 TABLET, FILM COATED ORAL 2 TIMES DAILY
Status: DISCONTINUED | OUTPATIENT
Start: 2017-07-24 | End: 2017-07-24

## 2017-07-24 RX ORDER — SODIUM CHLORIDE 9 MG/ML
INJECTION, SOLUTION INTRAVENOUS CONTINUOUS
Status: DISCONTINUED | OUTPATIENT
Start: 2017-07-24 | End: 2017-07-28

## 2017-07-24 RX ORDER — ACETAMINOPHEN 10 MG/ML
1000 INJECTION, SOLUTION INTRAVENOUS EVERY 6 HOURS PRN
Status: DISCONTINUED | OUTPATIENT
Start: 2017-07-24 | End: 2017-07-27

## 2017-07-24 RX ADMIN — FAMOTIDINE 20 MG: 10 INJECTION, SOLUTION INTRAVENOUS at 03:25

## 2017-07-24 RX ADMIN — SODIUM CHLORIDE, POTASSIUM CHLORIDE, SODIUM LACTATE AND CALCIUM CHLORIDE: 600; 310; 30; 20 INJECTION, SOLUTION INTRAVENOUS at 03:36

## 2017-07-24 RX ADMIN — FAMOTIDINE 20 MG: 10 INJECTION, SOLUTION INTRAVENOUS at 14:58

## 2017-07-24 RX ADMIN — SODIUM CHLORIDE: 9 INJECTION, SOLUTION INTRAVENOUS at 09:18

## 2017-07-24 RX ADMIN — METOPROLOL TARTRATE 10 MG: 5 INJECTION INTRAVENOUS at 09:22

## 2017-07-24 RX ADMIN — METOPROLOL TARTRATE 10 MG: 5 INJECTION INTRAVENOUS at 20:29

## 2017-07-24 RX ADMIN — FOLIC ACID: 5 INJECTION, SOLUTION INTRAMUSCULAR; INTRAVENOUS; SUBCUTANEOUS at 22:15

## 2017-07-24 RX ADMIN — SODIUM CHLORIDE: 9 INJECTION, SOLUTION INTRAVENOUS at 18:29

## 2017-07-24 RX ADMIN — FOLIC ACID: 5 INJECTION, SOLUTION INTRAMUSCULAR; INTRAVENOUS; SUBCUTANEOUS at 01:03

## 2017-07-24 RX ADMIN — METOPROLOL TARTRATE 10 MG: 5 INJECTION INTRAVENOUS at 14:52

## 2017-07-24 ASSESSMENT — PAIN DESCRIPTION - DESCRIPTORS
DESCRIPTORS: ACHING
DESCRIPTORS: SHARP
DESCRIPTORS: TIGHTNESS;SHARP

## 2017-07-24 NOTE — PLAN OF CARE
Patient remains hospitalized for pancreatitis. Pain controlled with Dilaudid PCA. Remains NPO with IVF. Lipase improved to 964. BP and HR remain elevated - initiated on IV metoprolol for BP and HR control with improvement. Tmax of 100.8. Scoring 3-4 on CIWA due to tremors and sweating. Requiring 1L O2 to keep sats > 90%. IS encouraged. Voiding adequately. Remains on telemetry monitoring. Ambulating with standby assist.

## 2017-07-24 NOTE — PROGRESS NOTES
[facility]    Hospitalist Progress Note    Date of Service (when I saw the patient): 07/24/2017  Provider:  Mikey Arroyo MD     Initial presenting complaint/issue to hospital (Diagnosis):Abdomen pain, nausea and vomiting.    Assessment & Plan   Yovani Servin is a 36 year old male who presents with excruciating epigastric pain, elevated lipase, recent alcohol use and BAL on admission of 0.10 g/dL Abnormal liver enzymes, AGMA and compensatory tachypnea with hypocapnia.  1.  Acute alcoholic pancreatitis.  -Leukocytosis, resolved. Mild thrombocytopenia today.High ANC. Hgb WNL. Lipase down 964 (3648)  -Moderate dehydration, resolving.  -Mild hyperglycemia.  -No evidence of immediate complications until now.  2.  Anion gap metabolic acidosis.  Resolved.  3.  Tachypnea/hypocapnia secondary to #2. Resolved  4.  Acute alcoholic hepatitis on alcoholic steatosis. Enzymes trending down. Mild elevation of bili today, Alk Phosp normalized.   5.  High risk for CHEKO.   6.  Persistent sinus tachycardia. Suspect multifactorial: fluid deficit, acute stress, alcohol withdrawal etc.  7.  Hypertension. Denies prior history. Suspect related to acute events (pancreatitis, CHEKO) though non dx htn is also into consideration.       Plan.  Inpatient. Telemetry  Strict n.p.o.  I and O's, strict.  PCA for pain control.  Treatment of nausea and vomiting per protocol.  Famotidine 20 mg q.12 hours IV.    mL per hour.  CIWA protocol.  Metoprolol 10 mg IV Cq 6 hrs. Apresoline prn.  Follow up BS trend, no other intervention is needed at this moment.  Watchful waiting, close follow up for complications. Hypodensity in pancreas head may be an early sign of necrosis.   Consider CD counseling before discharge.  Lipase, CRP. CBC and CMP follow-up in a.m.    DVT Prophylaxis: Pneumatic Compression Devices  Code Status: Full Code    Disposition: Expected discharge once pain free, normal lab values and full oral tolerance.  .    Interval History   Pain in upper-mid abdomen still not totally controlled though better, exacerbates with deep breathing-cough. Not passing gas. Hungry and thirsty. Passing dark urine. No CP or dyspnea.       -Data reviewed today: I reviewed all new labs and imaging results over the last 24 hours. I personally reviewed the EKG tracing showing sinus tachycardia in [facility]. .    Physical Exam   Temp: 100  F (37.8  C) Temp src: Oral BP: (!) 171/107   Heart Rate: 106 Resp: 18 SpO2: 97 % O2 Device: Nasal cannula Oxygen Delivery: 3 LPM  Vitals:    07/23/17 1318 07/23/17 1355   Weight: 90.7 kg (200 lb) 87.1 kg (192 lb)     Vital Signs with Ranges  Temp:  [98.5  F (36.9  C)-100.5  F (38.1  C)] 100  F (37.8  C)  Heart Rate:  [102-142] 106  Resp:  [16-20] 18  BP: (123-171)/() 171/107  SpO2:  [88 %-98 %] 97 %  I/O last 3 completed shifts:  In: 3965 [I.V.:2965; IV Piggyback:1000]  Out: 600 [Urine:600]    GEN:  Alert, oriented x 3, appears comfortable, NAD.  HEENT:  Normocephalic/atraumatic, no scleral icterus, no nasal discharge, mouth moist.  CV:  Regular rate and rhythm, no murmur or JVD.  S1 + S2 noted, no S3 or S4.  LUNGS:  Clear to auscultation bilaterally without rales/rhonchi/wheezing/retractions.  Symmetric chest rise on inhalation noted.  ABD:Hypoactive bowel sounds, firm, tender in mid upper/non-distended.  Guarding is ppresent/ no rigidity. Rebound not explored.  EXT:  No edema or cyanosis.  No joint synovitis noted.  SKIN:  Dry to touch, no exanthems noted in the visualized areas.  NEURO: no hand tremor present at this moment.     Medications     lactated ringers 200 mL/hr at 07/24/17 0336       metoprolol  25 mg Oral BID     famotidine  20 mg Intravenous Q12H     HYDROmorphone   Intravenous PCA     IV Fluid with vitamins   Intravenous Q24H       Data     Recent Labs  Lab 07/24/17 0810 07/23/17 2023 07/23/17 0853   WBC 10.1  --  13.8*   HGB 14.3  --  15.7   MCV 95  --  92   *  --  196   NA  --   136 135   POTASSIUM  --  4.1 3.6   CHLORIDE  --  104 100   CO2  --  23 16*   BUN  --  9 12   CR  --  0.85 0.90   ANIONGAP  --  9 19*   ZAIRA  --  7.7* 8.3*   GLC  --  135* 134*   ALBUMIN  --   --  3.8   PROTTOTAL  --   --  7.9   BILITOTAL  --   --  0.9   ALKPHOS  --   --  151*   ALT  --   --  175*   AST  --   --  334*   LIPASE  --   --  3648*       Recent Results (from the past 24 hour(s))   Abdomen US, limited (RUQ only)    Narrative    ULTRASOUND ABDOMEN LIMITED July 23, 2017 10:18 AM     HISTORY: Upper abdominal pain; abnormal liver function tests.    FINDINGS: No cholelithiasis or apparent gallbladder wall thickening.  There is no apparent biliary dilatation. There is an 11.9 x 4.1 x 4.1  cm fluid collection at the midline of the upper abdomen of  indeterminate etiology or significance. Increased hepatic echotexture  suggests fatty infiltration. The right kidney appears within normal  limits. The pancreas is completely obscured by bowel gas.      Impression    IMPRESSION: No cholelithiasis or biliary dilatation. Midline abdominal  fluid collection of indeterminate etiology or significance.    DENISE ELIAS MD   CT Abdomen Pelvis w Contrast    Narrative    CT ABDOMEN/PELVIS WITH CONTRAST July 23, 2017 11:10 AM     HISTORY: Pancreatitis; abnormal fluid collection on ultrasound.    CONTRAST DOSE: 100mL Isovue-370.    TECHNIQUE: Radiation dose for this scan was reduced using automated  exposure control, adjustment of the mA and/or kV according to patient  size, or iterative reconstruction technique.    FINDINGS: There is severe hepatic fatty infiltration. Peripancreatic  stranding is noted adjacent to the pancreatic head. Hypodensity is  also noted within the pancreatic head. The adjacent portion of the  duodenum may be thickened. No discrete soft tissue fluid collection is  visible. The spleen, gallbladder, adrenal glands, and kidneys appear  within normal limits. Stranding is noted along the right  paracolic  gutter. There is no evidence of bowel obstruction. No free peritoneal  fluid or air. Pelvic contents appear within normal limits.      Impression    IMPRESSION:  1. Peripancreatic and right paracolic stranding as well as hypodensity  within the pancreatic head. These findings suggest pancreatitis.  2. Severe hepatic fatty infiltration.    DENISE ELIAS MD             Disclaimer: This note consists of symbols derived from keyboarding, dictation and/or voice recognition software. As a result, there may be errors in the script that have gone undetected. Please consider this when interpreting information found in this chart.

## 2017-07-24 NOTE — PROVIDER NOTIFICATION
Admission pager texted: Admission doctor ordered BMP recheck this evening. Result is back. Only change is that calcium level trending down to 7.7

## 2017-07-24 NOTE — PLAN OF CARE
Problem: Goal Outcome Summary  Goal: Goal Outcome Summary  Outcome: No Change  Pt hospitalized for pancreatitis.   BP remain elevated, tachy, and low grade temp.   Paged MD regarding elevated BP, last check 171/107 waiting for response.   Currently on 3L NC.   PCA for abdominal pain, total used 1.6 mg.  CIWA scores 2, 2.  PIV /hr and multi vitamin bag 100/hr.   BS faint, not passing flatus, abdomen distended. Voiding without difficulty.

## 2017-07-24 NOTE — PLAN OF CARE
Problem: Goal Outcome Summary  Goal: Goal Outcome Summary  Pt pain improved with Dilaudid PCA. Complains of slight nausea but denies need for intervention. No active bowel sounds. Abdomen distended. Pt denies passing flatus. Last BM yesterday, loose.

## 2017-07-24 NOTE — PROVIDER NOTIFICATION
"Admission pager texted: Tele tech just called me, Pt is sustaining heart rate in 140s. /114, temp 100.2. He feels \"jittery\". Fluids have been running at 200cc hourly since he arrived. He states he still feels dehydrated. I have all current I and O information in computer for you. Tele tech called again and heart rate 170s now  "

## 2017-07-25 LAB
ALBUMIN SERPL-MCNC: 2.8 G/DL (ref 3.4–5)
ALP SERPL-CCNC: 97 U/L (ref 40–150)
ALT SERPL W P-5'-P-CCNC: 70 U/L (ref 0–70)
ANION GAP SERPL CALCULATED.3IONS-SCNC: 8 MMOL/L (ref 3–14)
AST SERPL W P-5'-P-CCNC: 83 U/L (ref 0–45)
BASOPHILS # BLD AUTO: 0.1 10E9/L (ref 0–0.2)
BASOPHILS NFR BLD AUTO: 0.7 %
BILIRUB SERPL-MCNC: 1.1 MG/DL (ref 0.2–1.3)
BUN SERPL-MCNC: 9 MG/DL (ref 7–30)
CALCIUM SERPL-MCNC: 8.3 MG/DL (ref 8.5–10.1)
CHLORIDE SERPL-SCNC: 102 MMOL/L (ref 94–109)
CO2 SERPL-SCNC: 25 MMOL/L (ref 20–32)
CREAT SERPL-MCNC: 0.85 MG/DL (ref 0.66–1.25)
CRP SERPL-MCNC: 360 MG/L (ref 0–8)
DIFFERENTIAL METHOD BLD: ABNORMAL
EOSINOPHIL # BLD AUTO: 0.2 10E9/L (ref 0–0.7)
EOSINOPHIL NFR BLD AUTO: 2.2 %
ERYTHROCYTE [DISTWIDTH] IN BLOOD BY AUTOMATED COUNT: 15.1 % (ref 10–15)
GFR SERPL CREATININE-BSD FRML MDRD: ABNORMAL ML/MIN/1.7M2
GLUCOSE SERPL-MCNC: 70 MG/DL (ref 70–99)
HCT VFR BLD AUTO: 39.3 % (ref 40–53)
HGB BLD-MCNC: 13.1 G/DL (ref 13.3–17.7)
IMM GRANULOCYTES # BLD: 0.1 10E9/L (ref 0–0.4)
IMM GRANULOCYTES NFR BLD: 0.9 %
LIPASE SERPL-CCNC: 472 U/L (ref 73–393)
LYMPHOCYTES # BLD AUTO: 1 10E9/L (ref 0.8–5.3)
LYMPHOCYTES NFR BLD AUTO: 13.3 %
MAGNESIUM SERPL-MCNC: 1.8 MG/DL (ref 1.6–2.3)
MCH RBC QN AUTO: 32.8 PG (ref 26.5–33)
MCHC RBC AUTO-ENTMCNC: 33.3 G/DL (ref 31.5–36.5)
MCV RBC AUTO: 99 FL (ref 78–100)
MONOCYTES # BLD AUTO: 0.7 10E9/L (ref 0–1.3)
MONOCYTES NFR BLD AUTO: 9.9 %
NEUTROPHILS # BLD AUTO: 5.4 10E9/L (ref 1.6–8.3)
NEUTROPHILS NFR BLD AUTO: 73 %
NRBC # BLD AUTO: 0 10*3/UL
NRBC BLD AUTO-RTO: 0 /100
PLATELET # BLD AUTO: 125 10E9/L (ref 150–450)
POTASSIUM SERPL-SCNC: 3.7 MMOL/L (ref 3.4–5.3)
PROT SERPL-MCNC: 7.2 G/DL (ref 6.8–8.8)
RBC # BLD AUTO: 3.99 10E12/L (ref 4.4–5.9)
SODIUM SERPL-SCNC: 135 MMOL/L (ref 133–144)
WBC # BLD AUTO: 7.4 10E9/L (ref 4–11)

## 2017-07-25 PROCEDURE — 83735 ASSAY OF MAGNESIUM: CPT | Performed by: HOSPITALIST

## 2017-07-25 PROCEDURE — 85025 COMPLETE CBC W/AUTO DIFF WBC: CPT | Performed by: HOSPITALIST

## 2017-07-25 PROCEDURE — 83690 ASSAY OF LIPASE: CPT | Performed by: HOSPITALIST

## 2017-07-25 PROCEDURE — 25000125 ZZHC RX 250: Performed by: INTERNAL MEDICINE

## 2017-07-25 PROCEDURE — 99232 SBSQ HOSP IP/OBS MODERATE 35: CPT | Performed by: HOSPITALIST

## 2017-07-25 PROCEDURE — 36415 COLL VENOUS BLD VENIPUNCTURE: CPT | Performed by: HOSPITALIST

## 2017-07-25 PROCEDURE — 25000125 ZZHC RX 250: Performed by: HOSPITALIST

## 2017-07-25 PROCEDURE — 80053 COMPREHEN METABOLIC PANEL: CPT | Performed by: HOSPITALIST

## 2017-07-25 PROCEDURE — 12000007 ZZH R&B INTERMEDIATE

## 2017-07-25 PROCEDURE — 99207 ZZC CDG-MDM COMPONENT: MEETS LOW - DOWN CODED: CPT | Performed by: HOSPITALIST

## 2017-07-25 PROCEDURE — S0028 INJECTION, FAMOTIDINE, 20 MG: HCPCS | Performed by: HOSPITALIST

## 2017-07-25 PROCEDURE — 25000128 H RX IP 250 OP 636: Performed by: INTERNAL MEDICINE

## 2017-07-25 PROCEDURE — 25000128 H RX IP 250 OP 636: Performed by: HOSPITALIST

## 2017-07-25 PROCEDURE — 86140 C-REACTIVE PROTEIN: CPT | Performed by: HOSPITALIST

## 2017-07-25 RX ORDER — LIDOCAINE 40 MG/G
CREAM TOPICAL
Status: DISCONTINUED | OUTPATIENT
Start: 2017-07-25 | End: 2017-07-31 | Stop reason: HOSPADM

## 2017-07-25 RX ORDER — HYDROMORPHONE HYDROCHLORIDE 1 MG/ML
.3-.5 INJECTION, SOLUTION INTRAMUSCULAR; INTRAVENOUS; SUBCUTANEOUS
Status: DISCONTINUED | OUTPATIENT
Start: 2017-07-25 | End: 2017-07-31 | Stop reason: DRUGHIGH

## 2017-07-25 RX ADMIN — HYDROMORPHONE HYDROCHLORIDE 0.5 MG: 1 INJECTION, SOLUTION INTRAMUSCULAR; INTRAVENOUS; SUBCUTANEOUS at 16:24

## 2017-07-25 RX ADMIN — FAMOTIDINE 20 MG: 10 INJECTION, SOLUTION INTRAVENOUS at 03:06

## 2017-07-25 RX ADMIN — METOPROLOL TARTRATE 10 MG: 5 INJECTION INTRAVENOUS at 03:05

## 2017-07-25 RX ADMIN — METOPROLOL TARTRATE 10 MG: 5 INJECTION INTRAVENOUS at 20:05

## 2017-07-25 RX ADMIN — HYDROMORPHONE HYDROCHLORIDE 0.5 MG: 1 INJECTION, SOLUTION INTRAMUSCULAR; INTRAVENOUS; SUBCUTANEOUS at 23:54

## 2017-07-25 RX ADMIN — FOLIC ACID: 5 INJECTION, SOLUTION INTRAMUSCULAR; INTRAVENOUS; SUBCUTANEOUS at 21:31

## 2017-07-25 RX ADMIN — FAMOTIDINE 20 MG: 10 INJECTION, SOLUTION INTRAVENOUS at 14:30

## 2017-07-25 RX ADMIN — METOPROLOL TARTRATE 10 MG: 5 INJECTION INTRAVENOUS at 08:28

## 2017-07-25 RX ADMIN — SODIUM CHLORIDE: 9 INJECTION, SOLUTION INTRAVENOUS at 06:29

## 2017-07-25 RX ADMIN — METOPROLOL TARTRATE 10 MG: 5 INJECTION INTRAVENOUS at 14:25

## 2017-07-25 RX ADMIN — HYDROMORPHONE HYDROCHLORIDE 0.3 MG: 1 INJECTION, SOLUTION INTRAMUSCULAR; INTRAVENOUS; SUBCUTANEOUS at 21:31

## 2017-07-25 RX ADMIN — HYDROMORPHONE HYDROCHLORIDE 0.3 MG: 1 INJECTION, SOLUTION INTRAMUSCULAR; INTRAVENOUS; SUBCUTANEOUS at 19:16

## 2017-07-25 RX ADMIN — SODIUM CHLORIDE: 9 INJECTION, SOLUTION INTRAVENOUS at 19:10

## 2017-07-25 NOTE — PLAN OF CARE
"Problem: Goal Outcome Summary  Goal: Goal Outcome Summary  Outcome: Improving  Pt A&O, up in halls independently, Tier A activity level. Tmax 101.0, BP elevated to 150/90's, scheduled metoprolol with recheck 142/96. Tele monitoring, SR/ST per tele tech. C/o moderate abd pain, adequate pain control with PCA dilaudid, 0.3mg doses, 5.1 total. CIWA 3 + 2 for minor tremors, anxiety, + sweating. NPO, -n/v. BS hypoactive, -flatus, -BM this evening. IVF running, adequate UOP, \"brown-orange\" per pt report, will continue to monitor.   "

## 2017-07-25 NOTE — PROGRESS NOTES
[facility]    Hospitalist Progress Note    Date of Service (when I saw the patient): 07/25/2017  Provider:  Mikey Arroyo MD     Initial presenting complaint/issue to hospital (Diagnosis):Abdomen pain, nausea and vomiting.    Assessment & Plan   Yovani Servin is a 36 year old male who presents with excruciating epigastric pain, elevated lipase, recent alcohol use and BAL on admission of 0.10 g/dL Abnormal liver enzymes, AGMA and compensatory tachypnea with hypocapnia.  1.  Acute alcoholic pancreatitis.  -Leukocytosis, resolved. Mild thrombocytopenia today.High ANC. Hgb sl low. Lipase down 472 -> 964 (3648)  -Moderate dehydration, resolved.  -Mild hyperglycemia.  -No evidence of immediate complications until now.  2.  Anion gap metabolic acidosis.  Resolved.  3.  Tachypnea/hypocapnia secondary to #2. Resolved  4.  Acute alcoholic hepatitis on alcoholic steatosis. Enzymes normal or close to.  Bili normal today, Alk Phosp normalized.   5.  High risk for CHEKO.   6.  Persistent sinus tachycardia. Suspect multifactorial: fluid deficit, acute stress, alcohol withdrawal etc. CRP is still rising.   7.  Hypertension. Denies prior history. Suspect related to acute events (pancreatitis, CHEKO) though non dx htn is also into consideration.       Plan.  Inpatient. Telemetry  Allow ice chips and sips of clear liquids.  I and O's, strict.  Dc PCA for pain control. Dilaudid IV prn.   Treatment of nausea and vomiting per protocol.  Famotidine 20 mg q.12 hours IV.    mL per hour.  CIWA protocol. Very low scores.  Metoprolol 10 mg IV Cq 6 hrs. Apresoline prn.  Follow up BS trend, no other intervention is needed at this moment.  Watchful waiting, close follow up for complications. Hypodensity in pancreas head may be an early sign of necrosis.   Consider CD counseling before discharge.  Lipase, CRP. CBC and CMP follow-up in a.m.    DVT Prophylaxis: Pneumatic Compression Devices  Code Status: Full  Code    Disposition: Expected discharge once pain free, normal lab values and full oral tolerance. .    Interval History   Pain in upper-mid abdomen in much better control, now more around the navel and less intense 3-4/10. Passing gas down, occasional cramping pain with peristalsis, no BM.  Hungry. Still dark urine. No CP or dyspnea.       -Data reviewed today: I reviewed all new labs and imaging results over the last 24 hours. I personally reviewed the EKG tracing showing sinus tachycardia in [facility]. .    Physical Exam   Temp: 99.6  F (37.6  C) Temp src: Oral BP: 147/90   Heart Rate: 103 Resp: 18 SpO2: 93 % O2 Device: None (Room air) Oxygen Delivery: 1 LPM  Vitals:    07/23/17 1318 07/23/17 1355   Weight: 90.7 kg (200 lb) 87.1 kg (192 lb)     Vital Signs with Ranges  Temp:  [99.4  F (37.4  C)-100.8  F (38.2  C)] 99.6  F (37.6  C)  Heart Rate:  [] 103  Resp:  [16-24] 18  BP: (131-155)/(89-98) 147/90  SpO2:  [91 %-94 %] 93 %  I/O last 3 completed shifts:  In: 3297 [I.V.:3297]  Out: 1275 [Urine:1275]    GEN:  Alert, oriented x 3, appears comfortable, NAD.  HEENT:  Normocephalic/atraumatic, no scleral icterus, no nasal discharge, mouth moist.  CV:  Regular tachycardia, sinus in monitor, no murmur or JVD.  S1 + S2 noted, no S3 or S4.  LUNGS:  Clear to auscultation bilaterally without rales/rhonchi/wheezing/retractions.  Symmetric chest rise on inhalation noted.  ABD: Active bowel sounds, less tender in mid upper/non-distended. Firm, no guarding/no rigidity. Rebound not explored.  EXT:  No edema or cyanosis.  No joint synovitis noted.  SKIN:  Dry to touch, no exanthems noted in the visualized areas.  NEURO: no hand tremor present at this moment.     Medications     NaCl 50 mL/hr at 07/25/17 0629       metoprolol  10 mg Intravenous Q6H     HYDROmorphone   Intravenous PCA     famotidine  20 mg Intravenous Q12H     IV Fluid with vitamins   Intravenous Q24H       Data     Recent Labs  Lab 07/25/17  0800  07/24/17  0810 07/23/17 2023 07/23/17  0853   WBC 7.4 10.1  --  13.8*   HGB 13.1* 14.3  --  15.7   MCV 99 95  --  92   * 121*  --  196    135 136 135   POTASSIUM 3.7 3.8 4.1 3.6   CHLORIDE 102 104 104 100   CO2 25 23 23 16*   BUN 9 7 9 12   CR 0.85 0.86 0.85 0.90   ANIONGAP 8 8 9 19*   ZAIRA 8.3* 8.3* 7.7* 8.3*   GLC 70 110* 135* 134*   ALBUMIN 2.8* 2.8*  --  3.8   PROTTOTAL 7.2 6.8  --  7.9   BILITOTAL 1.1 1.7*  --  0.9   ALKPHOS 97 104  --  151*   ALT 70 95*  --  175*   AST 83* 114*  --  334*   LIPASE 472* 964*  --  3648*       No results found for this or any previous visit (from the past 24 hour(s)).          Disclaimer: This note consists of symbols derived from keyboarding, dictation and/or voice recognition software. As a result, there may be errors in the script that have gone undetected. Please consider this when interpreting information found in this chart.

## 2017-07-25 NOTE — PLAN OF CARE
Problem: Goal Outcome Summary  Goal: Goal Outcome Summary  Outcome: Improving  Patient remains hospitalized for pancreatitis. Pain controlled with Dilaudid PCA - discontinued this afternoon. Advanced to ice chips and possibly clear liquids this evening. Lipase improved to 472. CRP increased today to 360. BP and HR remain elevated, but improved since yesterday - continues on IV metoprolol. Tmax of 99.1. Scoring 0s on CIWA. Sats > 90% on room air. Voiding adequately. Remains on telemetry monitoring. Ambulating independently.

## 2017-07-25 NOTE — PLAN OF CARE
Problem: Goal Outcome Summary  Goal: Goal Outcome Summary  Outcome: Improving  Ambulatory Status:  Pt up independently.  VS:  Hypertension and Tachycardia, temp max 100.3  Pain:  Pain controled with PCA   Resp: LS clear  GI:  denies nausea.  NPO.  BS active.  Passing flatus.  Last BM 7/22  Labs:  lipase 964  Disposition:  tbd     Tele: Sinus Rhythm.  When patient up to bathroom tele tech called to report patient HR in 150's.  When patient returned to bed, HR returned to 100.     CIWA scores:  1 and 0

## 2017-07-26 LAB
ALBUMIN SERPL-MCNC: 2.4 G/DL (ref 3.4–5)
ALP SERPL-CCNC: 97 U/L (ref 40–150)
ALT SERPL W P-5'-P-CCNC: 58 U/L (ref 0–70)
ANION GAP SERPL CALCULATED.3IONS-SCNC: 11 MMOL/L (ref 3–14)
AST SERPL W P-5'-P-CCNC: 89 U/L (ref 0–45)
BASOPHILS # BLD AUTO: 0.1 10E9/L (ref 0–0.2)
BASOPHILS NFR BLD AUTO: 0.9 %
BILIRUB SERPL-MCNC: 1 MG/DL (ref 0.2–1.3)
BUN SERPL-MCNC: 8 MG/DL (ref 7–30)
CALCIUM SERPL-MCNC: 8.1 MG/DL (ref 8.5–10.1)
CHLORIDE SERPL-SCNC: 104 MMOL/L (ref 94–109)
CO2 SERPL-SCNC: 21 MMOL/L (ref 20–32)
CREAT SERPL-MCNC: 0.74 MG/DL (ref 0.66–1.25)
CRP SERPL-MCNC: 249 MG/L (ref 0–8)
DIFFERENTIAL METHOD BLD: ABNORMAL
EOSINOPHIL # BLD AUTO: 0.2 10E9/L (ref 0–0.7)
EOSINOPHIL NFR BLD AUTO: 2.3 %
ERYTHROCYTE [DISTWIDTH] IN BLOOD BY AUTOMATED COUNT: 14.9 % (ref 10–15)
GFR SERPL CREATININE-BSD FRML MDRD: ABNORMAL ML/MIN/1.7M2
GLUCOSE SERPL-MCNC: 65 MG/DL (ref 70–99)
HCT VFR BLD AUTO: 34.2 % (ref 40–53)
HGB BLD-MCNC: 11.5 G/DL (ref 13.3–17.7)
IMM GRANULOCYTES # BLD: 0.1 10E9/L (ref 0–0.4)
IMM GRANULOCYTES NFR BLD: 1.6 %
LIPASE SERPL-CCNC: 590 U/L (ref 73–393)
LYMPHOCYTES # BLD AUTO: 1 10E9/L (ref 0.8–5.3)
LYMPHOCYTES NFR BLD AUTO: 13.1 %
MCH RBC QN AUTO: 33 PG (ref 26.5–33)
MCHC RBC AUTO-ENTMCNC: 33.6 G/DL (ref 31.5–36.5)
MCV RBC AUTO: 98 FL (ref 78–100)
MONOCYTES # BLD AUTO: 1.1 10E9/L (ref 0–1.3)
MONOCYTES NFR BLD AUTO: 15 %
NEUTROPHILS # BLD AUTO: 5 10E9/L (ref 1.6–8.3)
NEUTROPHILS NFR BLD AUTO: 67.1 %
NRBC # BLD AUTO: 0 10*3/UL
NRBC BLD AUTO-RTO: 0 /100
PLATELET # BLD AUTO: 135 10E9/L (ref 150–450)
POTASSIUM SERPL-SCNC: 3.2 MMOL/L (ref 3.4–5.3)
PROT SERPL-MCNC: 6.5 G/DL (ref 6.8–8.8)
RBC # BLD AUTO: 3.48 10E12/L (ref 4.4–5.9)
SODIUM SERPL-SCNC: 136 MMOL/L (ref 133–144)
WBC # BLD AUTO: 7.5 10E9/L (ref 4–11)

## 2017-07-26 PROCEDURE — 25000128 H RX IP 250 OP 636: Performed by: HOSPITALIST

## 2017-07-26 PROCEDURE — 25000125 ZZHC RX 250: Performed by: HOSPITALIST

## 2017-07-26 PROCEDURE — 99207 ZZC CDG-MDM COMPONENT: MEETS LOW - DOWN CODED: CPT | Performed by: INTERNAL MEDICINE

## 2017-07-26 PROCEDURE — 99232 SBSQ HOSP IP/OBS MODERATE 35: CPT | Performed by: INTERNAL MEDICINE

## 2017-07-26 PROCEDURE — 25000132 ZZH RX MED GY IP 250 OP 250 PS 637: Performed by: INTERNAL MEDICINE

## 2017-07-26 PROCEDURE — 86140 C-REACTIVE PROTEIN: CPT | Performed by: HOSPITALIST

## 2017-07-26 PROCEDURE — 12000007 ZZH R&B INTERMEDIATE

## 2017-07-26 PROCEDURE — 83690 ASSAY OF LIPASE: CPT | Performed by: HOSPITALIST

## 2017-07-26 PROCEDURE — 40000556 ZZH STATISTIC PERIPHERAL IV START W US GUIDANCE

## 2017-07-26 PROCEDURE — 36415 COLL VENOUS BLD VENIPUNCTURE: CPT | Performed by: HOSPITALIST

## 2017-07-26 PROCEDURE — 80053 COMPREHEN METABOLIC PANEL: CPT | Performed by: HOSPITALIST

## 2017-07-26 PROCEDURE — 25000128 H RX IP 250 OP 636: Performed by: INTERNAL MEDICINE

## 2017-07-26 PROCEDURE — S0028 INJECTION, FAMOTIDINE, 20 MG: HCPCS | Performed by: HOSPITALIST

## 2017-07-26 PROCEDURE — 85025 COMPLETE CBC W/AUTO DIFF WBC: CPT | Performed by: HOSPITALIST

## 2017-07-26 RX ORDER — ONDANSETRON 4 MG/1
4 TABLET, ORALLY DISINTEGRATING ORAL EVERY 6 HOURS PRN
Status: DISCONTINUED | OUTPATIENT
Start: 2017-07-26 | End: 2017-07-31 | Stop reason: HOSPADM

## 2017-07-26 RX ORDER — NALOXONE HYDROCHLORIDE 0.4 MG/ML
.1-.4 INJECTION, SOLUTION INTRAMUSCULAR; INTRAVENOUS; SUBCUTANEOUS
Status: DISCONTINUED | OUTPATIENT
Start: 2017-07-26 | End: 2017-07-31 | Stop reason: HOSPADM

## 2017-07-26 RX ORDER — MULTIPLE VITAMINS W/ MINERALS TAB 9MG-400MCG
1 TAB ORAL DAILY
Status: DISCONTINUED | OUTPATIENT
Start: 2017-07-26 | End: 2017-07-31 | Stop reason: HOSPADM

## 2017-07-26 RX ORDER — LANOLIN ALCOHOL/MO/W.PET/CERES
100 CREAM (GRAM) TOPICAL DAILY
Status: COMPLETED | OUTPATIENT
Start: 2017-07-26 | End: 2017-07-30

## 2017-07-26 RX ORDER — HYDROMORPHONE HYDROCHLORIDE 1 MG/ML
0.5 INJECTION, SOLUTION INTRAMUSCULAR; INTRAVENOUS; SUBCUTANEOUS
Status: DISCONTINUED | OUTPATIENT
Start: 2017-07-26 | End: 2017-07-31 | Stop reason: HOSPADM

## 2017-07-26 RX ORDER — FOLIC ACID 1 MG/1
1 TABLET ORAL DAILY
Status: DISCONTINUED | OUTPATIENT
Start: 2017-07-26 | End: 2017-07-31 | Stop reason: HOSPADM

## 2017-07-26 RX ORDER — ONDANSETRON 2 MG/ML
4 INJECTION INTRAMUSCULAR; INTRAVENOUS EVERY 6 HOURS PRN
Status: DISCONTINUED | OUTPATIENT
Start: 2017-07-26 | End: 2017-07-31 | Stop reason: HOSPADM

## 2017-07-26 RX ADMIN — FAMOTIDINE 20 MG: 10 INJECTION, SOLUTION INTRAVENOUS at 16:11

## 2017-07-26 RX ADMIN — MULTIPLE VITAMINS W/ MINERALS TAB 1 TABLET: TAB at 19:13

## 2017-07-26 RX ADMIN — FAMOTIDINE 20 MG: 10 INJECTION, SOLUTION INTRAVENOUS at 03:14

## 2017-07-26 RX ADMIN — POTASSIUM CHLORIDE 10 MEQ: 14.9 INJECTION, SOLUTION, CONCENTRATE PARENTERAL at 19:01

## 2017-07-26 RX ADMIN — HYDROMORPHONE HYDROCHLORIDE 0.5 MG: 1 INJECTION, SOLUTION INTRAMUSCULAR; INTRAVENOUS; SUBCUTANEOUS at 11:45

## 2017-07-26 RX ADMIN — POTASSIUM CHLORIDE 10 MEQ: 14.9 INJECTION, SOLUTION, CONCENTRATE PARENTERAL at 15:50

## 2017-07-26 RX ADMIN — HYDROMORPHONE HYDROCHLORIDE 0.5 MG: 1 INJECTION, SOLUTION INTRAMUSCULAR; INTRAVENOUS; SUBCUTANEOUS at 13:56

## 2017-07-26 RX ADMIN — Medication 100 MG: at 19:13

## 2017-07-26 RX ADMIN — HYDROMORPHONE HYDROCHLORIDE: 10 INJECTION, SOLUTION INTRAMUSCULAR; INTRAVENOUS; SUBCUTANEOUS at 19:05

## 2017-07-26 RX ADMIN — METOPROLOL TARTRATE 10 MG: 5 INJECTION INTRAVENOUS at 21:44

## 2017-07-26 RX ADMIN — HYDROMORPHONE HYDROCHLORIDE: 10 INJECTION, SOLUTION INTRAMUSCULAR; INTRAVENOUS; SUBCUTANEOUS at 16:04

## 2017-07-26 RX ADMIN — HYDROMORPHONE HYDROCHLORIDE 0.5 MG: 1 INJECTION, SOLUTION INTRAMUSCULAR; INTRAVENOUS; SUBCUTANEOUS at 05:47

## 2017-07-26 RX ADMIN — FOLIC ACID 1 MG: 1 TABLET ORAL at 19:14

## 2017-07-26 RX ADMIN — METOPROLOL TARTRATE 10 MG: 5 INJECTION INTRAVENOUS at 13:09

## 2017-07-26 RX ADMIN — HYDROMORPHONE HYDROCHLORIDE 0.5 MG: 1 INJECTION, SOLUTION INTRAMUSCULAR; INTRAVENOUS; SUBCUTANEOUS at 03:22

## 2017-07-26 RX ADMIN — POTASSIUM CHLORIDE 10 MEQ: 14.9 INJECTION, SOLUTION, CONCENTRATE PARENTERAL at 17:52

## 2017-07-26 RX ADMIN — METOPROLOL TARTRATE 10 MG: 5 INJECTION INTRAVENOUS at 03:09

## 2017-07-26 RX ADMIN — POTASSIUM CHLORIDE 10 MEQ: 14.9 INJECTION, SOLUTION, CONCENTRATE PARENTERAL at 13:17

## 2017-07-26 ASSESSMENT — PAIN DESCRIPTION - DESCRIPTORS
DESCRIPTORS: SHARP
DESCRIPTORS: SHARP

## 2017-07-26 NOTE — PLAN OF CARE
Problem: Goal Outcome Summary  Goal: Goal Outcome Summary  Outcome: No Change  Pt remains admitted for pancreatitis. Pt left peripheral IVs both infiltrated during the shift and were pulled. New IV placed in the right arm. Pt reported a great amount of abdominal pain during the shift. IM dilaudid given x1 for pain. IV dilaudid given x1 for pain. Potassium level was 3.2 and replacement was started during the shift. Lipase level elevated at 590. CIWA score of 0. NS at 100 mL/hr. Up independent. Sips of clear liquids. Will continue to monitor.

## 2017-07-26 NOTE — PLAN OF CARE
"Problem: Goal Outcome Summary  Goal: Goal Outcome Summary  Outcome: No Change  Pt A&O, up in halls independently with family, Tier A activity. CIWA 0. Tmax 99.5, recheck 99.0 without intervention. BP elevated 150/90's, scheduled metoprolol. C/o moderate abd pain, \"hunger pains,\" IV dilaudid x2 with some relief. Ice chips + minimal clears, tolerating well without nausea. IVF running. Adequate UOP.      "

## 2017-07-26 NOTE — PLAN OF CARE
Problem: Goal Outcome Summary  Goal: Goal Outcome Summary  Outcome: No Change  Ambulatory Status:  Pt up independently.  VS:  tachycardia  Pain:  Rating pain an 8 in left flank area, IV dilaudid and ice applied.  Resp: LS clear  GI:  Denies nausea.  Tolerating clear liquids with no abdominal pain or nausea.  BS active.  Passing flatus.  Last BM 7/25.  :  Voiding adequately  Skin: intact  Labs:  Lipase  472  Disposition:  tbd     Patient not experiencing abdominal pain this evening, but instead complaining of a new left flank pain throughout the night requiring pain medication and ice.        Tele:  Sinus Rhythm   CIWA Scores:  0

## 2017-07-26 NOTE — PROGRESS NOTES
[facility]    Hospitalist Progress Note    Date of Service (when I saw the patient): 07/26/2017  Provider:  Carmelo Washington MD     Initial presenting complaint/issue to hospital (Diagnosis):Abdomen pain, nausea and vomiting.    Assessment & Plan   Yovani Servin is a 36 year old male who presented on 7/23.17 with excruciating epigastric pain, nausea and vomiting. He was found to have an elevated lipase and elevated BAL on admission (0.10 g/dL) abnormal liver enzymes consistent with alcoholic hepatitis. Imaging confirmed both findings consistent with acute pancreatitis and severe steatosis.     1.  Acute alcoholic pancreatitis.  -Leukocytosis, resolved. Mild thrombocytopenia is improving; ANC now normal; Hgb remains low, but relatively stable.   -Moderate dehydration, resolved.  -Mild hyperglycemia.  -No evidence of immediate complications.  2.  Tachypnea secondary to #2. Resolved  3.  Acute alcoholic hepatitis with alcoholic steatosis.    4.  High risk for CHEKO. Has not required any Benzos for this as yet and is now after the 72 hour window for high risk of serious withdrawal.   5.  Persistent sinus tachycardia. Suspect multifactorial: fluid deficit, acute stress, alcohol withdrawal etc.    6.  Hypertension. Denies prior history. Suspect related to acute events (pancreatitis, CHEKO) though non dx htn is also into consideration. Currently on Metoprolol 10 mg IV Q 6 hours.   7.  Mood disorder. Pt freely admits that he suffers from anxiety and has been managing it with alcohol intake. He has been reluctant to take medications because his father became suicidal when he was on Prozac. Pt is from [place] and was advised to follow up with a psychiatrist at home in order to initiate therapy for Anxiety/depression.   8. Multifactorial pain. Pt notes that he has some new pain in the back. On exam, some of this is worsened by palpation.         Plan.  Inpatient. OK to stop telemetry.  Allow ice chips. Will  resume clears tomorrow, assuming clinical stability.   Initially on PCA, then this was stopped due to incompatibility. However, pt lost his IV and developed new pain (in his back) overnight. Resume PCA with continuous.   Treatment of nausea and vomiting per protocol.  Famotidine 20 mg q.12 hours IV.    mL per hour.  CIWA protocol.   Metoprolol 10 mg IV q 6 hrs.   Follow up BS trend. If pt becomes symptomatic (hypoglycemic), will initiate dextrose-containing fluid.   Close observation for complications (fever. Hypodensity in pancreas head may be an early sign of necrosis.   CD counseling before discharge. Also recommend further eval with Psychiatry upon return to home.   Lipase, CRP, CBC in a.m.    DVT Prophylaxis: Pneumatic Compression Devices  Code Status: Full Code    Disposition: Expected discharge once pain free, normal lab values and full oral tolerance. .    Interval History   Chart reviewed, pt interviewed.      As noted above, pt admits to drinking in order to manage his anxiety. He has known about his Etoh abuse in the past and has multiple family members with substance-abuse history.     No further emesis, but worse pain overnight, now feels deeper in his back. No SOB or cough. Discomfort with breathing due to abdominal distention.       -Data reviewed today: I reviewed all new labs and imaging results over the last 24 hours.     Physical Exam   Temp: 100  F (37.8  C) Temp src: Oral BP: 138/80   Heart Rate: 91 Resp: 18 SpO2: 98 % O2 Device: None (Room air)    Vitals:    07/23/17 1318 07/23/17 1355   Weight: 90.7 kg (200 lb) 87.1 kg (192 lb)     Vital Signs with Ranges  Temp:  [99  F (37.2  C)-100  F (37.8  C)] 100  F (37.8  C)  Heart Rate:  [] 91  Resp:  [16-20] 18  BP: (133-157)/(76-98) 138/80  SpO2:  [93 %-98 %] 98 %  I/O last 3 completed shifts:  In: 2821 [P.O.:620; I.V.:2201]  Out: 1700 [Urine:1700]    GEN:  Alert, oriented x 3, appears comfortable, NAD.  HEENT:  Normocephalic/atraumatic,  no scleral icterus, no nasal discharge, mouth moist.  CV:  Regular tachycardia, sinus in monitor, no murmur or JVD.  S1 + S2 noted, no S3 or S4.  LUNGS:  Clear to auscultation bilaterally without rales/rhonchi/wheezing/retractions.  Symmetric chest rise on inhalation noted.  ABD: decreased bowel sounds, mildly tender in mid upper/non-distended. Firm, no guarding/no rigidity. Rebound not evident.  EXT:  No edema or cyanosis.  No joint synovitis noted. Mildly tender over the left upper lumbar/lower thoracic paraspinous muscles.   SKIN:  Dry to touch, no exanthems noted in the visualized areas.  NEURO: no hand tremor present at this moment.     Medications     NaCl 100 mL/hr at 07/25/17 1910       sodium chloride (PF)  3 mL Intravenous Q8H     metoprolol  10 mg Intravenous Q6H     famotidine  20 mg Intravenous Q12H     IV Fluid with vitamins   Intravenous Q24H       Data     Recent Labs  Lab 07/26/17  0602 07/25/17  0800 07/24/17  0810   WBC 7.5 7.4 10.1   HGB 11.5* 13.1* 14.3   MCV 98 99 95   * 125* 121*    135 135   POTASSIUM 3.2* 3.7 3.8   CHLORIDE 104 102 104   CO2 21 25 23   BUN 8 9 7   CR 0.74 0.85 0.86   ANIONGAP 11 8 8   ZAIRA 8.1* 8.3* 8.3*   GLC 65* 70 110*   ALBUMIN 2.4* 2.8* 2.8*   PROTTOTAL 6.5* 7.2 6.8   BILITOTAL 1.0 1.1 1.7*   ALKPHOS 97 97 104   ALT 58 70 95*   AST 89* 83* 114*   LIPASE 590* 472* 964*       No results found for this or any previous visit (from the past 24 hour(s)).

## 2017-07-27 LAB
ANION GAP SERPL CALCULATED.3IONS-SCNC: 10 MMOL/L (ref 3–14)
BUN SERPL-MCNC: 6 MG/DL (ref 7–30)
CALCIUM SERPL-MCNC: 8.4 MG/DL (ref 8.5–10.1)
CHLORIDE SERPL-SCNC: 102 MMOL/L (ref 94–109)
CO2 SERPL-SCNC: 23 MMOL/L (ref 20–32)
CREAT SERPL-MCNC: 0.75 MG/DL (ref 0.66–1.25)
GFR SERPL CREATININE-BSD FRML MDRD: ABNORMAL ML/MIN/1.7M2
GLUCOSE SERPL-MCNC: 72 MG/DL (ref 70–99)
LIPASE SERPL-CCNC: 1077 U/L (ref 73–393)
MAGNESIUM SERPL-MCNC: 2.1 MG/DL (ref 1.6–2.3)
PHOSPHATE SERPL-MCNC: 3.1 MG/DL (ref 2.5–4.5)
POTASSIUM SERPL-SCNC: 3.2 MMOL/L (ref 3.4–5.3)
POTASSIUM SERPL-SCNC: 3.4 MMOL/L (ref 3.4–5.3)
POTASSIUM SERPL-SCNC: 3.7 MMOL/L (ref 3.4–5.3)
SODIUM SERPL-SCNC: 135 MMOL/L (ref 133–144)

## 2017-07-27 PROCEDURE — 83735 ASSAY OF MAGNESIUM: CPT | Performed by: INTERNAL MEDICINE

## 2017-07-27 PROCEDURE — 25000125 ZZHC RX 250: Performed by: HOSPITALIST

## 2017-07-27 PROCEDURE — 25000132 ZZH RX MED GY IP 250 OP 250 PS 637

## 2017-07-27 PROCEDURE — 90791 PSYCH DIAGNOSTIC EVALUATION: CPT | Performed by: PSYCHOLOGIST

## 2017-07-27 PROCEDURE — 99207 ZZC CDG-MDM COMPONENT: MEETS LOW - DOWN CODED: CPT | Performed by: INTERNAL MEDICINE

## 2017-07-27 PROCEDURE — 25000128 H RX IP 250 OP 636: Performed by: HOSPITALIST

## 2017-07-27 PROCEDURE — 25000132 ZZH RX MED GY IP 250 OP 250 PS 637: Performed by: INTERNAL MEDICINE

## 2017-07-27 PROCEDURE — S0028 INJECTION, FAMOTIDINE, 20 MG: HCPCS | Performed by: HOSPITALIST

## 2017-07-27 PROCEDURE — 80048 BASIC METABOLIC PNL TOTAL CA: CPT | Performed by: INTERNAL MEDICINE

## 2017-07-27 PROCEDURE — 99232 SBSQ HOSP IP/OBS MODERATE 35: CPT | Performed by: INTERNAL MEDICINE

## 2017-07-27 PROCEDURE — 36415 COLL VENOUS BLD VENIPUNCTURE: CPT | Performed by: INTERNAL MEDICINE

## 2017-07-27 PROCEDURE — 25000132 ZZH RX MED GY IP 250 OP 250 PS 637: Performed by: HOSPITALIST

## 2017-07-27 PROCEDURE — 83690 ASSAY OF LIPASE: CPT | Performed by: INTERNAL MEDICINE

## 2017-07-27 PROCEDURE — 12000007 ZZH R&B INTERMEDIATE

## 2017-07-27 PROCEDURE — 84100 ASSAY OF PHOSPHORUS: CPT | Performed by: INTERNAL MEDICINE

## 2017-07-27 PROCEDURE — 84132 ASSAY OF SERUM POTASSIUM: CPT | Performed by: INTERNAL MEDICINE

## 2017-07-27 RX ORDER — AMLODIPINE BESYLATE 5 MG/1
5 TABLET ORAL DAILY
Status: DISCONTINUED | OUTPATIENT
Start: 2017-07-27 | End: 2017-07-27

## 2017-07-27 RX ORDER — ACETAMINOPHEN 325 MG/1
325-650 TABLET ORAL EVERY 4 HOURS PRN
Status: DISCONTINUED | OUTPATIENT
Start: 2017-07-27 | End: 2017-07-31 | Stop reason: HOSPADM

## 2017-07-27 RX ORDER — LORAZEPAM 0.5 MG/1
0.5 TABLET ORAL EVERY 4 HOURS PRN
Status: DISCONTINUED | OUTPATIENT
Start: 2017-07-27 | End: 2017-07-31 | Stop reason: HOSPADM

## 2017-07-27 RX ORDER — METOPROLOL TARTRATE 50 MG
50 TABLET ORAL 2 TIMES DAILY
Status: DISCONTINUED | OUTPATIENT
Start: 2017-07-27 | End: 2017-07-31 | Stop reason: HOSPADM

## 2017-07-27 RX ORDER — AMLODIPINE BESYLATE 5 MG/1
5 TABLET ORAL DAILY
Status: DISCONTINUED | OUTPATIENT
Start: 2017-07-27 | End: 2017-07-31 | Stop reason: HOSPADM

## 2017-07-27 RX ORDER — RISPERIDONE 0.5 MG/1
0.5 TABLET, ORALLY DISINTEGRATING ORAL AT BEDTIME
Status: DISCONTINUED | OUTPATIENT
Start: 2017-07-27 | End: 2017-07-27

## 2017-07-27 RX ORDER — QUETIAPINE FUMARATE 25 MG/1
25 TABLET, FILM COATED ORAL
Status: DISCONTINUED | OUTPATIENT
Start: 2017-07-27 | End: 2017-07-31 | Stop reason: HOSPADM

## 2017-07-27 RX ADMIN — HYDROMORPHONE HYDROCHLORIDE: 10 INJECTION, SOLUTION INTRAMUSCULAR; INTRAVENOUS; SUBCUTANEOUS at 14:18

## 2017-07-27 RX ADMIN — SODIUM CHLORIDE: 9 INJECTION, SOLUTION INTRAVENOUS at 21:01

## 2017-07-27 RX ADMIN — FOLIC ACID 1 MG: 1 TABLET ORAL at 08:36

## 2017-07-27 RX ADMIN — METOPROLOL TARTRATE 50 MG: 50 TABLET, FILM COATED ORAL at 20:18

## 2017-07-27 RX ADMIN — HYDROMORPHONE HYDROCHLORIDE: 10 INJECTION, SOLUTION INTRAMUSCULAR; INTRAVENOUS; SUBCUTANEOUS at 06:36

## 2017-07-27 RX ADMIN — POTASSIUM CHLORIDE 40 MEQ: 1500 TABLET, EXTENDED RELEASE ORAL at 08:36

## 2017-07-27 RX ADMIN — RANITIDINE 150 MG: 150 TABLET ORAL at 20:18

## 2017-07-27 RX ADMIN — SODIUM CHLORIDE: 9 INJECTION, SOLUTION INTRAVENOUS at 00:08

## 2017-07-27 RX ADMIN — METOPROLOL TARTRATE 10 MG: 5 INJECTION INTRAVENOUS at 03:14

## 2017-07-27 RX ADMIN — POTASSIUM CHLORIDE 20 MEQ: 1.5 POWDER, FOR SOLUTION ORAL at 10:23

## 2017-07-27 RX ADMIN — Medication 100 MG: at 08:36

## 2017-07-27 RX ADMIN — LORAZEPAM 0.5 MG: 0.5 TABLET ORAL at 13:34

## 2017-07-27 RX ADMIN — ACETAMINOPHEN 650 MG: 325 TABLET, FILM COATED ORAL at 20:27

## 2017-07-27 RX ADMIN — MULTIPLE VITAMINS W/ MINERALS TAB 1 TABLET: TAB at 08:36

## 2017-07-27 RX ADMIN — SODIUM CHLORIDE: 9 INJECTION, SOLUTION INTRAVENOUS at 10:18

## 2017-07-27 RX ADMIN — AMLODIPINE BESYLATE 5 MG: 5 TABLET ORAL at 16:10

## 2017-07-27 RX ADMIN — FAMOTIDINE 20 MG: 10 INJECTION, SOLUTION INTRAVENOUS at 03:14

## 2017-07-27 RX ADMIN — HYDROMORPHONE HYDROCHLORIDE: 10 INJECTION, SOLUTION INTRAMUSCULAR; INTRAVENOUS; SUBCUTANEOUS at 21:35

## 2017-07-27 RX ADMIN — LORAZEPAM 0.5 MG: 0.5 TABLET ORAL at 18:13

## 2017-07-27 RX ADMIN — METOPROLOL TARTRATE 10 MG: 5 INJECTION INTRAVENOUS at 10:17

## 2017-07-27 ASSESSMENT — PAIN DESCRIPTION - DESCRIPTORS
DESCRIPTORS: SHARP
DESCRIPTORS: SHARP

## 2017-07-27 NOTE — CONSULTS
"Psychological consult, covering for psychiatry, ordered by Dr. Washington.   This was an initial consult, which took 55 minutes, with five minutes spent reviewing records, 30 minutes spent interviewing and counseling the patient, and the rest of the time documenting, with consultation with Dr. Washington.     Reason for consult  Yovani Servin is a 36 year old male who presented on 7/23/17 with excruciating epigastric pain, nausea and vomiting. He was found to have an elevated lipase and elevated BAL on admission (0.10 g/dL) abnormal liver enzymes consistent with alcoholic hepatitis. Imaging confirmed both findings consistent with acute pancreatitis and severe steatosis.  Pt freely admits that he suffers from anxiety and has been managing it with alcohol intake. He has been reluctant to take medications because his father became suicidal when he was on Prozac. Pt is from [place] and was advised to follow up with a psychiatrist at home in order to initiate therapy for Anxiety/depression.     I was asked to help assess current risk factors, to help with mental and chemical health diagnosis,  and to provide input into discharge planning.     Records reviewed   H&P done by Dr. Arroyo, including review of systems.  Current information in EPIC:   Patient has been restless and complained of difficulty with pain.   Previous treatment records:   I briefly reviewed previous treatment records, most of which are from several years ago, and did not locate any obvious psychiatric or behavioral records.    Patient Report of Admission Events/Circumstances  Yovani readily acknowledged that he has \"the disease of anxiety,\" and that he drinks in order to cope with anxiety.  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  He readily acknowledged that substance abuse and dependency are occupational hazards for this field, and " "reports that several of his colleagues have either had to go through treatment or have developed significant health problems as result of their own abuse.  He views himself as an intense person, and as being strong willed.  He also said, \"once I set my mind to something, I usually can do it and I have decided I needed to stop drinking.\"  He reports that he has lived in [place], [place] until recently, when he relocated to [place] in order to be close to a child there.  He views his primary residence is [place], but it appears that most of his work occurs in [place].    Behavioral Assessment    Yovani initially greeted me in a somewhat wary manner, and had questions about \"the purpose of the interview.\"  Once he became acquainted with me, however, he relaxed considerably and by the end of the interview he was engaged and collaborative.  He was oriented in all spheres.  He maintained normal attention and concentration throughout the interview.  There are no indications of short or long-term memory impairments.  His thought processes were goal-directed, and his associations were intact.  He made no peculiar or delusional statements.  There were no obvious behavioral indications of any problems with his basic perceptual and thinking processes    Yovani's speech was clear, fluent, and nonpressured.  He communicated effectively with me, and has an above average fund of knowledge.  He appears to be functioning in the likely Above Average range of cognitive abilities.  He demonstrated today good insight into his treatment needs and also good judgment about how to obtain help when he returns to his home community.  It appears likely, however, that these are recent developments.    Yovani described his current mood as \"frustrated,\" which he attributed to concerns about his health, pain, and being  from his family and business.  He does not view himself as currently being depressed.  There are no obvious behavioral " "indications of depression.  I asked about possible manic symptoms, and he acknowledged a history of what sounds like high intensity behaviors after closing a high stakes business deal, such as skipping sleep in order to celebrate, impulsive spending, and similar behaviors.  He also expressed, today, remorse for these behaviors and reports that he and his friends had made a conscious effort to \"grow up\" and handle business successes in a healthier fashion.  He reports that he is recently been able to restrain himself from his previous behaviors.    He views his primary problem to be anxiety.  He also views himself as a person who tends to have a high level of activation and intensity.  He usually needs for five hours of sleep, but reports that he has needed more recently as he has felt run down.  His appetite has been lower than usual, and his energy level also has been lower.  He was able, on his own, to connect these problems with his recent alcohol abuse.     He has no history of hallucinations or other psychotic symptoms.    Mental and Chemical Health Treatment History    Yovani has been prescribed Xanax in the past, found to be helpful, but also reports that he discontinued taking it because, \"I didn't want to depend upon a drug\" and \"I viewed taking pills as a weakness.\"  He readily acknowledged that alcohol, his drug of choice, is a drug and he has been abusing it.  He is aware that medications other than Xanax could possibly be prescribed for anxiety, but he is highly concerned about trying them as his father reportedly committed suicide, and this has been attributed to his reaction to prescribed antidepressant medication.  Yovani views himself as very similar to his father.    Yovani's only exposure to psychological services occurred when he saw a counselor briefly after his father's death by suicide.  We discussed the potential benefits of trying psychotherapy, in addition to antianxiety medication, and he " "was very receptive to this.  I provided a referral to a highly regarded group of psychologist in the [place] area.  He seemed interested in following up with them.    Yovani has not participated in chemical dependency treatment program.  He was referred to a two week program, which sounds primarily educational, as a result of a positive drug screen at a work setting.  We discussed the possibility that he would require the support and structure of CD treatment in order to establish sobriety, and he is adamant that he will be able to do it on his own, and is not currently interested in a referral to treatment.    Risk Assessment  Yovani has no history of suicidal behavior, and denied, with solid eye contact and convincing affect, current suicidal risk.  There are no indications of danger to others.    Impressions  Strengths:  Once he became acquainted with me, Yovani interacted with me in a comfortable and collaborative manner.  He seems to have experienced a \"wakeup call\" as a result of his current health crisis, and to understand that he needs to establish sobriety and learn other ways to cope with anxiety.  Liabilities:  Yovani clearly has been abusing alcohol and self-medicating.    Diagnoses    Alcohol dependency, not yet in remission; anxiety disorder, unspecified; pancreatitis secondary to alcohol abuse.    Recommendations  1.  I conferred with Dr. purdy, who will follow up with Dr. Urban in regard to medication recommendations prior to discharge.  2.  I strongly encouraged Yovani to establish sobriety.  He seemed receptive, at least today.  3.  I also strongly encouraged Yovani to follow-up with community providers for psychiatric and psychological services, and particularly work on anxiety management skills.  Once again, he seemed receptive, at least today.  4.  When medically stable, discharge to community medical and mental health resources in [place], [place].    Ulises Perez Psy.D., " L.P.  [phone]

## 2017-07-27 NOTE — PROGRESS NOTES
[facility]    Hospitalist Progress Note    Date of Service (when I saw the patient): 07/27/2017  Provider:  Carmelo Washington MD     Initial presenting complaint/issue to hospital (Diagnosis): Abdominal pain, nausea and vomiting.    Assessment & Plan   Yovani Servin is a 36 year old male who presented on 7/23.17 with excruciating epigastric pain, nausea and vomiting. He was found to have an elevated lipase and elevated BAL on admission (0.10 g/dL) abnormal liver enzymes consistent with alcoholic hepatitis. Imaging confirmed both findings consistent with acute pancreatitis and severe steatosis.     Following admission, the patient improved with conservative measures and had little to no withdrawal symptoms. He ate some on 7/25 and with that, seemed to take a few steps back in terms of his recovery. He is now symptomatically improved (no N/V, but continues with pain radiating through to his back). He is impatient to start eating and feels that he is extremely anxious.     Unfortunately, he is also directive with regard to his care. He had Psychiatric evaluation today and seems to endorse a willingness to try to stop drinking. He also appears to have some insight into his motivation for drinking (treatment of anxiety disorder).     1.  Acute alcoholic pancreatitis.  - Leukocytosis, resolved. Mild thrombocytopenia is improving; ANC now normal; Hgb remains low, but relatively stable.   - ongoing pain and Lipase level has risen again today. Pt requesting to take in more nutrition. Encouraged him to take smaller volumes, but asked for Nutrition to see him.  - Moderate dehydration, resolved.  - Mild hyperglycemia.  - No evidence of immediate complications.  2.  Tachypnea secondary to #2. Resolved  3.  Acute alcoholic hepatitis with alcoholic steatosis.    4.  High risk for CHEKO. Has not required any Benzos for this as yet and is now after the 72 hour window for high risk of serious withdrawal. CIWA stopped,  "but will offer Ativan for anxiety.  5.  Persistent sinus tachycardia. Suspect multifactorial: fluid deficit, acute stress, alcohol withdrawal etc.    6.  Hypertension. Denies prior history. Suspect related to acute events (pancreatitis, CHEKO) though non dx htn is also into consideration. Currently on Metoprolol 10 mg IV Q 6 hours.   7.  Mood disorder. Pt freely admits that he suffers from anxiety and has been managing it with alcohol intake. He has been reluctant to take medications because his father became suicidal when he was on Prozac. Pt is from [place] and was advised to follow up with a psychiatrist at home in order to initiate therapy for Anxiety/depression. Offer Lorazepam orally for anxiety as well as Quetiapine for HS.  8. Multifactorial pain. Pt notes that he has some new pain in the back. On exam, some of this is worsened by palpation.         Plan.  Resume clears. Nutrition consult requested.  Initially on PCA, then this was stopped due to incompatibility. However, pt lost his IV and developed new pain (in his back) overnight. Resume PCA with continuous.   Treatment of nausea and vomiting per protocol.  Famotidine 20 mg q.12 hours IV.    mL per hour.  CIWA protocol.   Change IV Metoprolol to 50 mg BID oral with Amlodipine 5 mg daily.  Follow up BS trend. If pt becomes symptomatic (hypoglycemic), will initiate dextrose-containing fluid.   Close observation for complications (fever). Hypodensity in pancreas head may be an early sign of necrosis.  Psych consult completed today.   Lipase, CRP, CBC in a.m.    DVT Prophylaxis: Pneumatic Compression Devices  Code Status: Full Code    Disposition: Expected discharge once pain free, normal lab values and full oral tolerance. .    Interval History   Wants to eat. Wants medication for anxiety, and refuses anything other than benzos. Not sleeping well. Pain reportedly improved, but reportedly not using many \"bumps\" of dilaudid. No emesis. No " diarrhea.      -Data reviewed today: I reviewed all new labs and imaging results over the last 24 hours.     Physical Exam   Temp: 99.7  F (37.6  C) Temp src: Oral BP: (!) 172/109   Heart Rate: 99 Resp: 16 SpO2: 95 % O2 Device: None (Room air)    Vitals:    07/23/17 1318 07/23/17 1355   Weight: 90.7 kg (200 lb) 87.1 kg (192 lb)     Vital Signs with Ranges  Temp:  [97.8  F (36.6  C)-100.2  F (37.9  C)] 99.7  F (37.6  C)  Heart Rate:  [79-99] 99  Resp:  [16] 16  BP: (136-173)/() 172/109  SpO2:  [94 %-98 %] 95 %  I/O last 3 completed shifts:  In: 2659 [P.O.:1000; I.V.:1659]  Out: 4225 [Urine:4225]    GEN:  Alert, oriented x 3, appears comfortable, NAD.  HEENT:  Normocephalic/atraumatic, no scleral icterus, no nasal discharge, mouth moist.  CV:  Regular tachycardia, sinus in monitor, no murmur or JVD.  S1 + S2 noted, no S3 or S4.  LUNGS:  Clear to auscultation bilaterally without rales/rhonchi/wheezing/retractions.  Symmetric chest rise on inhalation noted.  ABD: decreased bowel sounds, mildly tender in mid upper/non-distended. No guarding/not rigid, but appears to resist exam. Rebound not evident.  EXT:  No edema or cyanosis.  No joint synovitis noted. Mildly tender over the left upper lumbar/lower thoracic paraspinous muscles.   SKIN:  Dry to touch, no exanthems noted in the visualized areas.  NEURO: no hand tremor present at this moment.     Medications     NaCl 100 mL/hr at 07/27/17 1018       metoprolol  50 mg Oral BID     amLODIPine  5 mg Oral Daily     Risperidone  0.5 mg Sublingual At Bedtime     thiamine  100 mg Oral Daily     folic acid  1 mg Oral Daily     multivitamin, therapeutic with minerals  1 tablet Oral Daily     HYDROmorphone   Intravenous PCA     sodium chloride (PF)  3 mL Intravenous Q8H     famotidine  20 mg Intravenous Q12H       Data     Recent Labs  Lab 07/27/17  0724 07/27/17  0040 07/26/17  0602 07/25/17  0800 07/24/17  0810   WBC  --   --  7.5 7.4 10.1   HGB  --   --  11.5* 13.1* 14.3    MCV  --   --  98 99 95   PLT  --   --  135* 125* 121*     --  136 135 135   POTASSIUM 3.2* 3.4 3.2* 3.7 3.8   CHLORIDE 102  --  104 102 104   CO2 23  --  21 25 23   BUN 6*  --  8 9 7   CR 0.75  --  0.74 0.85 0.86   ANIONGAP 10  --  11 8 8   ZAIRA 8.4*  --  8.1* 8.3* 8.3*   GLC 72  --  65* 70 110*   ALBUMIN  --   --  2.4* 2.8* 2.8*   PROTTOTAL  --   --  6.5* 7.2 6.8   BILITOTAL  --   --  1.0 1.1 1.7*   ALKPHOS  --   --  97 97 104   ALT  --   --  58 70 95*   AST  --   --  89* 83* 114*   LIPASE 1077*  --  590* 472* 964*

## 2017-07-27 NOTE — PLAN OF CARE
Problem: Goal Outcome Summary  Goal: Goal Outcome Summary  Patient had pain during this shift, which was managed by using his PCA pump (dose changed per MD to continous). Vitamins were changed from IV to PO during this shift. IV potassium was given per MAR for potassium replacement, ordered lab to be drawn at 1045pm.

## 2017-07-27 NOTE — CONSULTS
"CLINICAL NUTRITION SERVICES  -  ASSESSMENT NOTE      Malnutrition: Does not meet criteria at this time        REASON FOR ASSESSMENT  Yovani Servin is a 36 year old male seen by Registered Dietitian for Provider Order - \"pancreatitis. Are there clear liquid options for on-going oral nutrition now?\"      NUTRITION HISTORY  - Information obtained from patient.    - Admitted with alcohol-related pancreatitis.  Admitted to managing anxiety via alcohol.    - Reports avoiding gluten and dairy at home though question consistency.  Enjoys grass-fed and \"fresh\" food items.   - Reports decrease in appetite/intakes \"since Friday\" (x 6 days) d/t N/V and increasing abdominal pain.  States he is \"extremely worried about nutrition\".    - NKFA.      CURRENT NUTRITION ORDERS  Diet Order:     Clears    Current Intake/Tolerance:  NPO/clears x 4 days since admission.  Not satisfied with the quality of options available on clears.  Wants a friend to bring in a broth option.  Discussed that ok if is truly a broth (no cream-based/milk/etc.).  However RN tells me that patient has been non-compliant with NPO recommendations (Mom has been bringing patient liquid options from [facility]).        PHYSICAL FINDINGS  Observed  No signs of muscle or fat wasting  Obtained from Chart/Interdisciplinary Team  BM 7/24    ANTHROPOMETRICS  Height: 6' 0\"  Weight: 90.9 kg (200#)  Body mass index is 26.04 kg/(m^2).  Weight Status:  Overweight BMI 25-29.9  IBW: 80.9 kg (178#)  % IBW: 112%  Weight History:  Wt Readings from Last 10 Encounters:   07/23/17 87.1 kg (192 lb)   - 200-205# UBW per patient report.  2 admit wts available, question accuracy of 192# given UBW reported.    LABS  Labs reviewed    MEDICATIONS  Medications reviewed:  - MVI/M    Dosing Weight 90.9 kg - admit wt    ASSESSED NUTRITION NEEDS PER APPROVED PRACTICE GUIDELINES:  Estimated Energy Needs: 7576-7962 kcals (25-30 Kcal/Kg)  Justification: maintenance  Estimated Protein " Needs:  grams protein (1-1.2 g pro/Kg)  Justification: maintenance  Estimated Fluid Needs: 8767-4564 mL (1 mL/Kcal)  Justification: maintenance and per provider pending fluid status    MALNUTRITION:  % Weight Loss:  None noted  % Intake:  </= 50% for >/= 5 days (severe malnutrition)  Subcutaneous Fat Loss:  None observed  Muscle Loss:  None observed  Fluid Retention:  None noted    Malnutrition Diagnosis: Patient does not meet two of the above criteria necessary for diagnosing malnutrition    NUTRITION DIAGNOSIS:  Inadequate oral intake related to N/V and abdominal pain associated with etoh pancreatitis as evidenced by NPO/clears x 4 days since admit, meeting <50% needs x 6 days total.       NUTRITION INTERVENTIONS  Recommendations / Nutrition Prescription  Diet per MD.     Ensure clear BID between meals.     Continue daily MVI/M.    Implementation  Nutrition education: Provided education on clears with further advancement per MD, Ensure clear supplement.    Medical Food Supplement: As above.    Collaboration and Referral of Nutrition care: Discussed POC with team during rounds, oral intakes with RN, diet advancement with MD.       Nutrition Goals  Diet advancement past clears w/in 48 hrs.       MONITORING AND EVALUATION:  Progress towards goals will be monitored and evaluated per protocol and Practice Guidelines        Sera Ron RD, LD  Clinical Dietitian  3rd floor/ICU: [phone]  All other floors: [phone]  Weekend/holiday: [phone]

## 2017-07-27 NOTE — PLAN OF CARE
Problem: Goal Outcome Summary  Goal: Goal Outcome Summary  Outcome: No Change  Pt remains admitted for alcohol induced pancreatitis. Pt complained of minimal pain during the shift. Dilaudid PCA relieved pain. Pt reported no nausea during shift. Diet advanced to clears and tolerating well. Dietary ordered clear ensure 2x/day. CIWA score 0. Pt reported anxiety, ativan given x1 with relief. Psych consult in. Potassium level 3.2, replaced orally during shift, recheck at 230. Up independent. Will continue to monitor.

## 2017-07-28 LAB
ALBUMIN SERPL-MCNC: 2.5 G/DL (ref 3.4–5)
ALP SERPL-CCNC: 99 U/L (ref 40–150)
ALT SERPL W P-5'-P-CCNC: 62 U/L (ref 0–70)
ANION GAP SERPL CALCULATED.3IONS-SCNC: 8 MMOL/L (ref 3–14)
AST SERPL W P-5'-P-CCNC: 82 U/L (ref 0–45)
BILIRUB SERPL-MCNC: 0.8 MG/DL (ref 0.2–1.3)
BUN SERPL-MCNC: 4 MG/DL (ref 7–30)
CALCIUM SERPL-MCNC: 8.1 MG/DL (ref 8.5–10.1)
CHLORIDE SERPL-SCNC: 106 MMOL/L (ref 94–109)
CHOLEST SERPL-MCNC: 191 MG/DL
CO2 SERPL-SCNC: 25 MMOL/L (ref 20–32)
CREAT SERPL-MCNC: 0.85 MG/DL (ref 0.66–1.25)
ERYTHROCYTE [DISTWIDTH] IN BLOOD BY AUTOMATED COUNT: 15 % (ref 10–15)
GFR SERPL CREATININE-BSD FRML MDRD: ABNORMAL ML/MIN/1.7M2
GLUCOSE SERPL-MCNC: 93 MG/DL (ref 70–99)
HCT VFR BLD AUTO: 32.7 % (ref 40–53)
HDLC SERPL-MCNC: 24 MG/DL
HGB BLD-MCNC: 11 G/DL (ref 13.3–17.7)
LDLC SERPL CALC-MCNC: 140 MG/DL
LIPASE SERPL-CCNC: 722 U/L (ref 73–393)
MAGNESIUM SERPL-MCNC: 2.2 MG/DL (ref 1.6–2.3)
MCH RBC QN AUTO: 32.7 PG (ref 26.5–33)
MCHC RBC AUTO-ENTMCNC: 33.6 G/DL (ref 31.5–36.5)
MCV RBC AUTO: 97 FL (ref 78–100)
NONHDLC SERPL-MCNC: 167 MG/DL
PLATELET # BLD AUTO: 183 10E9/L (ref 150–450)
POTASSIUM SERPL-SCNC: 3.4 MMOL/L (ref 3.4–5.3)
PROT SERPL-MCNC: 6.6 G/DL (ref 6.8–8.8)
RBC # BLD AUTO: 3.36 10E12/L (ref 4.4–5.9)
SODIUM SERPL-SCNC: 139 MMOL/L (ref 133–144)
TRIGL SERPL-MCNC: 135 MG/DL
WBC # BLD AUTO: 6.5 10E9/L (ref 4–11)

## 2017-07-28 PROCEDURE — 25000128 H RX IP 250 OP 636: Performed by: INTERNAL MEDICINE

## 2017-07-28 PROCEDURE — 25000128 H RX IP 250 OP 636: Performed by: HOSPITALIST

## 2017-07-28 PROCEDURE — 83735 ASSAY OF MAGNESIUM: CPT | Performed by: INTERNAL MEDICINE

## 2017-07-28 PROCEDURE — 80061 LIPID PANEL: CPT | Performed by: INTERNAL MEDICINE

## 2017-07-28 PROCEDURE — 85027 COMPLETE CBC AUTOMATED: CPT | Performed by: INTERNAL MEDICINE

## 2017-07-28 PROCEDURE — 25000132 ZZH RX MED GY IP 250 OP 250 PS 637

## 2017-07-28 PROCEDURE — 36415 COLL VENOUS BLD VENIPUNCTURE: CPT | Performed by: INTERNAL MEDICINE

## 2017-07-28 PROCEDURE — 83690 ASSAY OF LIPASE: CPT | Performed by: INTERNAL MEDICINE

## 2017-07-28 PROCEDURE — 25000132 ZZH RX MED GY IP 250 OP 250 PS 637: Performed by: HOSPITALIST

## 2017-07-28 PROCEDURE — 99232 SBSQ HOSP IP/OBS MODERATE 35: CPT | Performed by: INTERNAL MEDICINE

## 2017-07-28 PROCEDURE — 25000132 ZZH RX MED GY IP 250 OP 250 PS 637: Performed by: INTERNAL MEDICINE

## 2017-07-28 PROCEDURE — 80053 COMPREHEN METABOLIC PANEL: CPT | Performed by: INTERNAL MEDICINE

## 2017-07-28 PROCEDURE — 12000007 ZZH R&B INTERMEDIATE

## 2017-07-28 RX ORDER — OXYCODONE HYDROCHLORIDE 5 MG/1
5-10 TABLET ORAL EVERY 4 HOURS PRN
Status: DISCONTINUED | OUTPATIENT
Start: 2017-07-28 | End: 2017-07-29

## 2017-07-28 RX ADMIN — RANITIDINE 150 MG: 150 TABLET ORAL at 08:43

## 2017-07-28 RX ADMIN — METOPROLOL TARTRATE 50 MG: 50 TABLET, FILM COATED ORAL at 20:21

## 2017-07-28 RX ADMIN — OXYCODONE HYDROCHLORIDE 10 MG: 5 TABLET ORAL at 21:04

## 2017-07-28 RX ADMIN — OXYCODONE HYDROCHLORIDE 10 MG: 5 TABLET ORAL at 16:42

## 2017-07-28 RX ADMIN — HYDROMORPHONE HYDROCHLORIDE 0.5 MG: 1 INJECTION, SOLUTION INTRAMUSCULAR; INTRAVENOUS; SUBCUTANEOUS at 17:45

## 2017-07-28 RX ADMIN — FOLIC ACID 1 MG: 1 TABLET ORAL at 08:43

## 2017-07-28 RX ADMIN — OXYCODONE HYDROCHLORIDE 10 MG: 5 TABLET ORAL at 12:17

## 2017-07-28 RX ADMIN — METOPROLOL TARTRATE 50 MG: 50 TABLET, FILM COATED ORAL at 08:43

## 2017-07-28 RX ADMIN — MULTIPLE VITAMINS W/ MINERALS TAB 1 TABLET: TAB at 08:43

## 2017-07-28 RX ADMIN — SODIUM CHLORIDE 1000 ML: 9 INJECTION, SOLUTION INTRAVENOUS at 06:30

## 2017-07-28 RX ADMIN — RANITIDINE 150 MG: 150 TABLET ORAL at 20:21

## 2017-07-28 RX ADMIN — HYDROMORPHONE HYDROCHLORIDE 0.5 MG: 1 INJECTION, SOLUTION INTRAMUSCULAR; INTRAVENOUS; SUBCUTANEOUS at 20:21

## 2017-07-28 RX ADMIN — AMLODIPINE BESYLATE 5 MG: 5 TABLET ORAL at 08:43

## 2017-07-28 RX ADMIN — LORAZEPAM 0.5 MG: 0.5 TABLET ORAL at 13:33

## 2017-07-28 RX ADMIN — Medication 100 MG: at 08:43

## 2017-07-28 ASSESSMENT — PAIN DESCRIPTION - DESCRIPTORS
DESCRIPTORS: SHARP
DESCRIPTORS: SHARP

## 2017-07-28 NOTE — PROGRESS NOTES
"CLINICAL NUTRITION SERVICES - BRIEF NOTE    - Diet advanced to no fat per MD this AM (question need for low-fat diet as no fat diet contains <10 g fat daily though per discussion with RN MD was already hesitant to advance this AM).   - Approached by patient's Mom in [facility] to provide diet education.  Discussed current no fat diet as ordered per MD with <10 g fat allowed daily (3.5 g total fat/meal) and also low-fat diet:      - Assessed learning needs, learning preferences, and willingness to learn.  Patient himself seeming upset that mom had requested education.    Nutrition Education (Content):  a) Provided room service handout on fat content, \"fat restricted nutrition therapy\"  b) Discussed foods high in fat to avoid, appropriate substitutes.    Discussed recommendations for <60 g total fat when on low fat diet.     Nutrition Education (Application):  a) Discussed eating habits and recommended alternative food choices    Patient verbalizes understanding of diet by verbalizing g fat recommended per day on both diets    Anticipate fair compliance    Diet Education - refer to Education Flowsheet    - Will continue following.     Sera Ron RD, NASIR  Clinical Dietitian  3rd floor/ICU: [phone]  All other floors: [phone]  Weekend/holiday: [phone]    Addendum:  - Discussed with Dr. Washington on floor difference between low-fat and no fat diet.  Per MD may upgrade to low-fat diet order tomorrow.    Sera Ron RD, NASIR  Clinical Dietitian  3rd floor/ICU: [phone]  All other floors: [phone]  Weekend/holiday: [phone]  "

## 2017-07-28 NOTE — PLAN OF CARE
Problem: Goal Outcome Summary  Goal: Goal Outcome Summary  Outcome: Improving  Orientation: Alert and oriented x4  VSS. 95% on RA. Afebrile.   LS: clear and equal bilaterally.   GI: Passing gas. No BM. Denies N/V. Tolerating clears.   : Adequate urine output. Clear yellow.   Skin: Scabs noted throughout. Skin otherwise intact.   Activity: Independent. Up in room throughout night. Pt slept comfortably throughout shift.   Pain: 5/10 back pain. Well controlled with pain medication and ice. PCA total 3.0.   Plan: Continue with current cares.

## 2017-07-28 NOTE — PROGRESS NOTES
[facility]    Hospitalist Progress Note    Date of Service (when I saw the patient): 07/28/2017  Provider:  Carmelo Washington MD     Initial presenting complaint/issue to hospital (Diagnosis): Abdominal pain, nausea and vomiting.    Assessment & Plan   Yovani Servin is a 36 year old male who presented on 7/23.17 with excruciating epigastric pain, nausea and vomiting. He was found to have an elevated lipase and elevated BAL on admission (0.10 g/dL) abnormal liver enzymes consistent with alcoholic hepatitis. Imaging confirmed both findings consistent with acute pancreatitis and severe steatosis.     Following admission, the patient improved with conservative measures and had little to no withdrawal symptoms. He ate some on 7/25 and with that, seemed to take a few steps back in terms of his recovery. He is now symptomatically improved (no N/V, but continues with pain radiating through to his back). He is impatient to start eating and feels that he is extremely anxious.     Unfortunately, he is also directive with regard to his care. He had Psychiatric evaluation today and seems to endorse a willingness to try to stop drinking. He also appears to have some insight into his motivation for drinking (treatment of anxiety disorder).     1.  Acute alcoholic pancreatitis.  - Leukocytosis, resolved. Mild thrombocytopenia is improving; ANC now normal; Hgb remains low, but relatively stable.   - ongoing pain and Lipase level has risen again today. Pt requesting to take in more nutrition. Encouraged him to take smaller volumes, but asked for Nutrition to see him.  - Moderate dehydration, resolved  - Mild hyperglycemia.  - No evidence of immediate complications.  2.  Tachypnea secondary to #2. Resolved  3.  Acute alcoholic hepatitis with alcoholic steatosis.  Resolving.  4.  High risk for CHEKO. Did not require any Benzos for true withdrawal syndrome. CIWA stopped, but will offer Ativan for anxiety.  5.  Persistent  sinus tachycardia. Suspect multifactorial: fluid deficit, acute stress, alcohol withdrawal etc.    6.  Hypertension. Denies prior history. Somewhat better with oral Metoprolol and Amlodipine.  7.  Mood disorder. Pt freely admits that he suffers from anxiety and has been managing it with alcohol intake. He has been reluctant to take medications because his father became suicidal when he was on Prozac. Pt is from [place] and was advised to follow up with a psychiatrist at home in order to initiate therapy for Anxiety/depression. Offer Lorazepam orally for anxiety as well as Quetiapine for HS.  8. Multifactorial pain. Pt notes that he has some new pain in the back. On exam, some of this is worsened by palpation.         Plan.  Advance to no fat diet. Will consider increasing to low fat tomorrow.   Stop PCA and start Oxycodone 5-10 mg q 4 hours prn. (This should be a liberal conversion from IV to oral narcotics.)  Treatment of nausea and vomiting per protocol.  Ranitidine po bid.  Stop IVF now.  Metoprolol to 50 mg BID oral and Amlodipine 5 mg daily.  Lipase, CBC in a.m.    DVT Prophylaxis: Pneumatic Compression Devices  Code Status: Full Code    Disposition: Expected discharge once pain free, normal lab values and full oral tolerance. .    Interval History   Slept well. Used two doses of Ativan. I discussed with him the possibility that he would try Quetiapine for anxiety and sleep.     No N/V. Pain well-controlled with PCA. Willing to try switch to orals. Denies diarrhea. Back pain persists.    -Data reviewed today: I reviewed all new labs and imaging results over the last 24 hours.     Physical Exam   Temp: 99.7  F (37.6  C) Temp src: Oral BP: (!) 139/98 Pulse: 85 Heart Rate: 102 Resp: 18 SpO2: 97 % O2 Device: None (Room air)    Vitals:    07/23/17 1318 07/23/17 1355   Weight: 90.7 kg (200 lb) 87.1 kg (192 lb)     Vital Signs with Ranges  Temp:  [99.5  F (37.5  C)-101  F (38.3  C)] 99.7  F (37.6  C)  Pulse:  [85]  85  Heart Rate:  [] 102  Resp:  [16-18] 18  BP: (139-153)/(74-98) 139/98  SpO2:  [95 %-97 %] 97 %  I/O last 3 completed shifts:  In: 3634 [P.O.:2030; I.V.:1604]  Out: 3400 [Urine:3400]    GEN:  Alert, oriented x 3, appears comfortable, NAD.  HEENT:  Normocephalic/atraumatic, no scleral icterus, no nasal discharge, mouth moist.  CV:  Regular tachycardia, sinus in monitor, no murmur or JVD.  S1 + S2 noted, no S3 or S4.  LUNGS:  Clear to auscultation bilaterally without rales/rhonchi/wheezing/retractions.  Symmetric chest rise on inhalation noted.  ABD: decreased bowel sounds, mildly tender in mid upper/non-distended. No guarding/not rigid, but appears to resist exam. Rebound not evident.  EXT:  No edema or cyanosis.  No joint synovitis noted. Mildly tender over the left upper lumbar/lower thoracic paraspinous muscles.   SKIN:  Dry to touch, no exanthems noted in the visualized areas.  NEURO: no hand tremor present at this moment.     Medications        metoprolol  50 mg Oral BID     ranitidine  150 mg Oral BID     amLODIPine  5 mg Oral Daily     thiamine  100 mg Oral Daily     folic acid  1 mg Oral Daily     multivitamin, therapeutic with minerals  1 tablet Oral Daily     sodium chloride (PF)  3 mL Intravenous Q8H       Data     Recent Labs  Lab 07/28/17  0633 07/27/17  1550 07/27/17  0724  07/26/17  0602 07/25/17  0800   WBC 6.5  --   --   --  7.5 7.4   HGB 11.0*  --   --   --  11.5* 13.1*   MCV 97  --   --   --  98 99     --   --   --  135* 125*     --  135  --  136 135   POTASSIUM 3.4 3.7 3.2*  < > 3.2* 3.7   CHLORIDE 106  --  102  --  104 102   CO2 25  --  23  --  21 25   BUN 4*  --  6*  --  8 9   CR 0.85  --  0.75  --  0.74 0.85   ANIONGAP 8  --  10  --  11 8   ZAIRA 8.1*  --  8.4*  --  8.1* 8.3*   GLC 93  --  72  --  65* 70   ALBUMIN 2.5*  --   --   --  2.4* 2.8*   PROTTOTAL 6.6*  --   --   --  6.5* 7.2   BILITOTAL 0.8  --   --   --  1.0 1.1   ALKPHOS 99  --   --   --  97 97   ALT 62  --   --    --  58 70   AST 82*  --   --   --  89* 83*   LIPASE 722*  --  1077*  --  590* 472*   < > = values in this interval not displayed.

## 2017-07-28 NOTE — PLAN OF CARE
Problem: Goal Outcome Summary  Goal: Goal Outcome Summary  Pt continues to have high blood pressure, pulse, and temp during this shift. Gave scheduled blood pressure medication. PRN tylenol last at 2027 with a temp of 101F and paged on call MD at 2126. PCA is controlling pain during this shift. Patient needed PRN lorazepam at 1800 due to having a panic attack. Continue to monitor patient.

## 2017-07-29 LAB
ANION GAP SERPL CALCULATED.3IONS-SCNC: 7 MMOL/L (ref 3–14)
BUN SERPL-MCNC: 4 MG/DL (ref 7–30)
CALCIUM SERPL-MCNC: 8.9 MG/DL (ref 8.5–10.1)
CHLORIDE SERPL-SCNC: 104 MMOL/L (ref 94–109)
CO2 SERPL-SCNC: 29 MMOL/L (ref 20–32)
CREAT SERPL-MCNC: 0.96 MG/DL (ref 0.66–1.25)
GFR SERPL CREATININE-BSD FRML MDRD: 89 ML/MIN/1.7M2
GLUCOSE SERPL-MCNC: 100 MG/DL (ref 70–99)
LIPASE SERPL-CCNC: 666 U/L (ref 73–393)
POTASSIUM SERPL-SCNC: 3.4 MMOL/L (ref 3.4–5.3)
SODIUM SERPL-SCNC: 140 MMOL/L (ref 133–144)

## 2017-07-29 PROCEDURE — 99232 SBSQ HOSP IP/OBS MODERATE 35: CPT | Performed by: INTERNAL MEDICINE

## 2017-07-29 PROCEDURE — 80048 BASIC METABOLIC PNL TOTAL CA: CPT | Performed by: INTERNAL MEDICINE

## 2017-07-29 PROCEDURE — 25000132 ZZH RX MED GY IP 250 OP 250 PS 637: Performed by: INTERNAL MEDICINE

## 2017-07-29 PROCEDURE — 25000132 ZZH RX MED GY IP 250 OP 250 PS 637

## 2017-07-29 PROCEDURE — 83690 ASSAY OF LIPASE: CPT | Performed by: INTERNAL MEDICINE

## 2017-07-29 PROCEDURE — 36415 COLL VENOUS BLD VENIPUNCTURE: CPT | Performed by: INTERNAL MEDICINE

## 2017-07-29 PROCEDURE — 25000132 ZZH RX MED GY IP 250 OP 250 PS 637: Performed by: HOSPITALIST

## 2017-07-29 PROCEDURE — 12000000 ZZH R&B MED SURG/OB

## 2017-07-29 PROCEDURE — 25000128 H RX IP 250 OP 636: Performed by: HOSPITALIST

## 2017-07-29 PROCEDURE — 25000128 H RX IP 250 OP 636: Performed by: INTERNAL MEDICINE

## 2017-07-29 PROCEDURE — 25000125 ZZHC RX 250: Performed by: INTERNAL MEDICINE

## 2017-07-29 RX ORDER — GINSENG 100 MG
CAPSULE ORAL 2 TIMES DAILY
Status: DISCONTINUED | OUTPATIENT
Start: 2017-07-29 | End: 2017-07-31 | Stop reason: HOSPADM

## 2017-07-29 RX ORDER — POLYETHYLENE GLYCOL 3350 17 G/17G
17 POWDER, FOR SOLUTION ORAL 2 TIMES DAILY PRN
Status: DISCONTINUED | OUTPATIENT
Start: 2017-07-29 | End: 2017-07-31 | Stop reason: HOSPADM

## 2017-07-29 RX ORDER — OXYCODONE HYDROCHLORIDE 5 MG/1
10-15 TABLET ORAL EVERY 4 HOURS PRN
Status: DISCONTINUED | OUTPATIENT
Start: 2017-07-29 | End: 2017-07-31

## 2017-07-29 RX ADMIN — ACETAMINOPHEN 650 MG: 325 TABLET, FILM COATED ORAL at 00:40

## 2017-07-29 RX ADMIN — OXYCODONE HYDROCHLORIDE 10 MG: 5 TABLET ORAL at 20:04

## 2017-07-29 RX ADMIN — METOPROLOL TARTRATE 50 MG: 50 TABLET, FILM COATED ORAL at 07:43

## 2017-07-29 RX ADMIN — OXYCODONE HYDROCHLORIDE 15 MG: 5 TABLET ORAL at 15:52

## 2017-07-29 RX ADMIN — HYDROMORPHONE HYDROCHLORIDE 0.5 MG: 1 INJECTION, SOLUTION INTRAMUSCULAR; INTRAVENOUS; SUBCUTANEOUS at 08:35

## 2017-07-29 RX ADMIN — BACITRACIN: 500 OINTMENT TOPICAL at 20:05

## 2017-07-29 RX ADMIN — RANITIDINE 150 MG: 150 TABLET ORAL at 20:05

## 2017-07-29 RX ADMIN — BACITRACIN: 500 OINTMENT TOPICAL at 09:46

## 2017-07-29 RX ADMIN — LORAZEPAM 0.5 MG: 0.5 TABLET ORAL at 18:08

## 2017-07-29 RX ADMIN — RANITIDINE 150 MG: 150 TABLET ORAL at 07:43

## 2017-07-29 RX ADMIN — LORAZEPAM 0.5 MG: 0.5 TABLET ORAL at 14:02

## 2017-07-29 RX ADMIN — POLYETHYLENE GLYCOL 3350 17 G: 17 POWDER, FOR SOLUTION ORAL at 20:05

## 2017-07-29 RX ADMIN — FOLIC ACID 1 MG: 1 TABLET ORAL at 07:43

## 2017-07-29 RX ADMIN — AMLODIPINE BESYLATE 5 MG: 5 TABLET ORAL at 07:43

## 2017-07-29 RX ADMIN — METOPROLOL TARTRATE 50 MG: 50 TABLET, FILM COATED ORAL at 20:04

## 2017-07-29 RX ADMIN — HYDROMORPHONE HYDROCHLORIDE 0.5 MG: 1 INJECTION, SOLUTION INTRAMUSCULAR; INTRAVENOUS; SUBCUTANEOUS at 00:40

## 2017-07-29 RX ADMIN — OXYCODONE HYDROCHLORIDE 15 MG: 5 TABLET ORAL at 11:51

## 2017-07-29 RX ADMIN — LORAZEPAM 0.5 MG: 0.5 TABLET ORAL at 10:04

## 2017-07-29 RX ADMIN — OXYCODONE HYDROCHLORIDE 10 MG: 5 TABLET ORAL at 06:27

## 2017-07-29 RX ADMIN — OXYCODONE HYDROCHLORIDE 10 MG: 5 TABLET ORAL at 01:31

## 2017-07-29 RX ADMIN — Medication 100 MG: at 07:43

## 2017-07-29 RX ADMIN — MULTIPLE VITAMINS W/ MINERALS TAB 1 TABLET: TAB at 07:43

## 2017-07-29 ASSESSMENT — PAIN DESCRIPTION - DESCRIPTORS: DESCRIPTORS: SHARP

## 2017-07-29 NOTE — PLAN OF CARE
Problem: Goal Outcome Summary  Goal: Goal Outcome Summary  Outcome: No Change  VSS. PRN oxy 10 mgx1, oxy 15mg x1, dilaudid 0.5mg x1 for pain control. Pt no longer has iv access. PRN ativan x2 w/ decreased anxiety. Bowel sounds hypoactive, passing gas. Upgraded to low fiber diet. Bacitracin ointment ordered for old iv site. Up independently. Lipase down to 666.

## 2017-07-29 NOTE — PLAN OF CARE
Problem: Goal Outcome Summary  Goal: Goal Outcome Summary  Vitals stable, temp max 100.0, tylenol given, rechecks 99.3 and 98.1. Oxycodone 10 mg, given twice and IV dilaudid x 1 for breakthrough pain. Denies nausea. Getting up independently, voiding adequately.

## 2017-07-29 NOTE — PLAN OF CARE
Problem: Goal Outcome Summary  Goal: Goal Outcome Summary  Outcome: Improving  Pt continues to complain of pain. IV dilaudid and oxycodone given. Continues on no fat diet. Up independently in room. States had BM today. Denies nausea. TMAX 99.7. Mom at bedside.

## 2017-07-30 LAB
ALBUMIN SERPL-MCNC: 2.8 G/DL (ref 3.4–5)
ALP SERPL-CCNC: 118 U/L (ref 40–150)
ALT SERPL W P-5'-P-CCNC: 73 U/L (ref 0–70)
ANION GAP SERPL CALCULATED.3IONS-SCNC: 6 MMOL/L (ref 3–14)
AST SERPL W P-5'-P-CCNC: 101 U/L (ref 0–45)
BILIRUB SERPL-MCNC: 0.8 MG/DL (ref 0.2–1.3)
BUN SERPL-MCNC: 6 MG/DL (ref 7–30)
CALCIUM SERPL-MCNC: 8.6 MG/DL (ref 8.5–10.1)
CHLORIDE SERPL-SCNC: 103 MMOL/L (ref 94–109)
CO2 SERPL-SCNC: 30 MMOL/L (ref 20–32)
CREAT SERPL-MCNC: 1.08 MG/DL (ref 0.66–1.25)
ERYTHROCYTE [DISTWIDTH] IN BLOOD BY AUTOMATED COUNT: 14.8 % (ref 10–15)
GFR SERPL CREATININE-BSD FRML MDRD: 78 ML/MIN/1.7M2
GLUCOSE SERPL-MCNC: 108 MG/DL (ref 70–99)
HCT VFR BLD AUTO: 35.7 % (ref 40–53)
HGB BLD-MCNC: 11.7 G/DL (ref 13.3–17.7)
LIPASE SERPL-CCNC: 661 U/L (ref 73–393)
MCH RBC QN AUTO: 32.3 PG (ref 26.5–33)
MCHC RBC AUTO-ENTMCNC: 32.8 G/DL (ref 31.5–36.5)
MCV RBC AUTO: 99 FL (ref 78–100)
PLATELET # BLD AUTO: 278 10E9/L (ref 150–450)
POTASSIUM SERPL-SCNC: 3.5 MMOL/L (ref 3.4–5.3)
PROT SERPL-MCNC: 7.3 G/DL (ref 6.8–8.8)
RBC # BLD AUTO: 3.62 10E12/L (ref 4.4–5.9)
SODIUM SERPL-SCNC: 139 MMOL/L (ref 133–144)
WBC # BLD AUTO: 6.8 10E9/L (ref 4–11)

## 2017-07-30 PROCEDURE — 83690 ASSAY OF LIPASE: CPT | Performed by: INTERNAL MEDICINE

## 2017-07-30 PROCEDURE — 99232 SBSQ HOSP IP/OBS MODERATE 35: CPT | Performed by: INTERNAL MEDICINE

## 2017-07-30 PROCEDURE — 25000125 ZZHC RX 250: Performed by: INTERNAL MEDICINE

## 2017-07-30 PROCEDURE — 25000132 ZZH RX MED GY IP 250 OP 250 PS 637

## 2017-07-30 PROCEDURE — 36415 COLL VENOUS BLD VENIPUNCTURE: CPT | Performed by: INTERNAL MEDICINE

## 2017-07-30 PROCEDURE — 80053 COMPREHEN METABOLIC PANEL: CPT | Performed by: INTERNAL MEDICINE

## 2017-07-30 PROCEDURE — 12000000 ZZH R&B MED SURG/OB

## 2017-07-30 PROCEDURE — 25000132 ZZH RX MED GY IP 250 OP 250 PS 637: Performed by: HOSPITALIST

## 2017-07-30 PROCEDURE — 85027 COMPLETE CBC AUTOMATED: CPT | Performed by: INTERNAL MEDICINE

## 2017-07-30 PROCEDURE — 25000132 ZZH RX MED GY IP 250 OP 250 PS 637: Performed by: INTERNAL MEDICINE

## 2017-07-30 RX ADMIN — METOPROLOL TARTRATE 50 MG: 50 TABLET, FILM COATED ORAL at 21:37

## 2017-07-30 RX ADMIN — FOLIC ACID 1 MG: 1 TABLET ORAL at 08:54

## 2017-07-30 RX ADMIN — AMLODIPINE BESYLATE 5 MG: 5 TABLET ORAL at 08:54

## 2017-07-30 RX ADMIN — BACITRACIN: 500 OINTMENT TOPICAL at 21:37

## 2017-07-30 RX ADMIN — POLYETHYLENE GLYCOL 3350 17 G: 17 POWDER, FOR SOLUTION ORAL at 08:57

## 2017-07-30 RX ADMIN — LORAZEPAM 0.5 MG: 0.5 TABLET ORAL at 18:05

## 2017-07-30 RX ADMIN — ACETAMINOPHEN 650 MG: 325 TABLET, FILM COATED ORAL at 08:18

## 2017-07-30 RX ADMIN — Medication 100 MG: at 08:53

## 2017-07-30 RX ADMIN — OXYCODONE HYDROCHLORIDE 10 MG: 5 TABLET ORAL at 21:37

## 2017-07-30 RX ADMIN — MULTIPLE VITAMINS W/ MINERALS TAB 1 TABLET: TAB at 08:53

## 2017-07-30 RX ADMIN — OXYCODONE HYDROCHLORIDE 10 MG: 5 TABLET ORAL at 08:18

## 2017-07-30 RX ADMIN — LORAZEPAM 0.5 MG: 0.5 TABLET ORAL at 08:18

## 2017-07-30 RX ADMIN — LORAZEPAM 0.5 MG: 0.5 TABLET ORAL at 00:31

## 2017-07-30 RX ADMIN — RANITIDINE 150 MG: 150 TABLET ORAL at 08:54

## 2017-07-30 RX ADMIN — POLYETHYLENE GLYCOL 3350 17 G: 17 POWDER, FOR SOLUTION ORAL at 16:48

## 2017-07-30 RX ADMIN — OXYCODONE HYDROCHLORIDE 10 MG: 5 TABLET ORAL at 00:31

## 2017-07-30 RX ADMIN — OXYCODONE HYDROCHLORIDE 10 MG: 5 TABLET ORAL at 12:47

## 2017-07-30 RX ADMIN — METOPROLOL TARTRATE 50 MG: 50 TABLET, FILM COATED ORAL at 08:54

## 2017-07-30 RX ADMIN — LORAZEPAM 0.5 MG: 0.5 TABLET ORAL at 12:47

## 2017-07-30 RX ADMIN — OXYCODONE HYDROCHLORIDE 10 MG: 5 TABLET ORAL at 16:47

## 2017-07-30 RX ADMIN — BACITRACIN: 500 OINTMENT TOPICAL at 08:54

## 2017-07-30 RX ADMIN — RANITIDINE 150 MG: 150 TABLET ORAL at 21:37

## 2017-07-30 ASSESSMENT — PAIN DESCRIPTION - DESCRIPTORS
DESCRIPTORS: SHARP
DESCRIPTORS: ACHING

## 2017-07-30 NOTE — PROGRESS NOTES
[facility]    Hospitalist Progress Note    Date of Service (when I saw the patient): 07/29/2017  Provider:  Carmelo Washington MD     Initial presenting complaint/issue to hospital (Diagnosis): Abdominal pain, nausea and vomiting.    Assessment & Plan   Yovani Servin is a 36 year old male who presented on 7/23/17 with excruciating epigastric pain, nausea and vomiting. He was found to have an elevated lipase and elevated BAL on admission (0.10 g/dL) abnormal liver enzymes consistent with alcoholic hepatitis. Imaging confirmed both findings consistent with acute pancreatitis and severe steatosis.     Following admission, the patient improved with conservative measures and had little to no withdrawal symptoms. He ate some on 7/25 and with that, seemed to take a few steps back in terms of his recovery. He is now symptomatically improved (no N/V, but continues with pain radiating through to his back). He is impatient to start eating and feels that he is extremely anxious.     Unfortunately, he is also directive with regard to his care. He had Psychiatric evaluation today and seems to endorse a willingness to try to stop drinking. He also appears to have some insight into his motivation for drinking (treatment of anxiety disorder).     1.  Acute alcoholic pancreatitis.  - Leukocytosis, resolved. Mild thrombocytopenia is improving; ANC now normal; Hgb remains low, but relatively stable.   - pt seems to be tolerating low fat diet without apparent pancreatic irritation.  - Moderate dehydration, resolved  - Mild hyperglycemia.  - No evidence of immediate complications.  2.  Tachypnea secondary to #2. Resolved  3.  Acute alcoholic hepatitis with alcoholic steatosis.  Resolving.  4.  High risk for CHEKO. Did not require any Benzos for true withdrawal syndrome. CIWA stopped, but will offer Ativan for anxiety.  5.  Persistent sinus tachycardia. Suspect multifactorial: fluid deficit, acute stress, alcohol withdrawal  etc.    6.  Hypertension. Denies prior history. Somewhat better with oral Metoprolol and Amlodipine.  7.  Mood disorder. Pt freely admits that he suffers from anxiety and has been managing it with alcohol intake. He has been reluctant to take medications because his father became suicidal when he was on Prozac. Pt is from [place] and was advised to follow up with a psychiatrist at home in order to initiate therapy for Anxiety/depression. Offer Lorazepam orally for anxiety as well as Quetiapine for HS. Encouraged Quetiapine.   8. Multifactorial pain. Pt notes that he has some new pain in the back. Pt now tolerating Oxycodone for pain control.        Plan.  Advance to no fat diet. Will consider increasing to low fat tomorrow.   Stop PCA and start Oxycodone 5-10 mg q 4 hours prn. (This should be a liberal conversion from IV to oral narcotics.)  Treatment of nausea and vomiting per protocol.  Ranitidine po bid.  Stop IVF now.  Metoprolol to 50 mg BID oral and Amlodipine 5 mg daily.  Lipase, CBC in a.m.    DVT Prophylaxis: Pneumatic Compression Devices  Code Status: Full Code    Disposition: Expected discharge once pain is controlled. Will likely discharge either 7/30 or 31.     Interval History   Slept poorly. Avoided second dose ativan last night. I discussed with him the possibility that he should try Quetiapine for anxiety and sleep.     No N/V. Pain borderline controlled with oxycodone 5-10 mg q 4 hours. Willing to try switch to orals. Denies diarrhea. Back pain persists.    -Data reviewed today: I reviewed all new labs and imaging results over the last 24 hours.     Physical Exam   Temp: 97.9  F (36.6  C) Temp src: Oral BP: (!) 142/92   Heart Rate: 106 Resp: 16 SpO2: 98 % O2 Device: None (Room air)    Vitals:    07/23/17 1318 07/23/17 1355   Weight: 90.7 kg (200 lb) 87.1 kg (192 lb)     Vital Signs with Ranges  Temp:  [97.9  F (36.6  C)-100  F (37.8  C)] 97.9  F (36.6  C)  Heart Rate:  [] 106  Resp:  [16]  16  BP: (118-142)/(66-92) 142/92  SpO2:  [94 %-98 %] 98 %  I/O last 3 completed shifts:  In: 800 [P.O.:800]  Out: -     GEN:  Alert, oriented x 3, appears comfortable, NAD.  HEENT:  Normocephalic/atraumatic, no scleral icterus, no nasal discharge, mouth moist.  CV:  Regular tachycardia, sinus in monitor, no murmur or JVD.  S1 + S2 noted, no S3 or S4.  LUNGS:  Clear to auscultation bilaterally without rales/rhonchi/wheezing/retractions.  Symmetric chest rise on inhalation noted.  ABD: decreased bowel sounds, mildly tender in mid upper/non-distended. No guarding/not rigid, but appears to resist exam. Rebound not evident.  EXT:  No edema or cyanosis.  No joint synovitis noted. Mildly tender over the left upper lumbar/lower thoracic paraspinous muscles.   SKIN:  Dry to touch, no exanthems noted in the visualized areas.  NEURO: no hand tremor present at this moment.     Medications        bacitracin   Topical BID     metoprolol  50 mg Oral BID     ranitidine  150 mg Oral BID     amLODIPine  5 mg Oral Daily     thiamine  100 mg Oral Daily     folic acid  1 mg Oral Daily     multivitamin, therapeutic with minerals  1 tablet Oral Daily     sodium chloride (PF)  3 mL Intravenous Q8H       Data     Recent Labs  Lab 07/29/17  0629 07/28/17  0633 07/27/17  1550 07/27/17  0724  07/26/17  0602 07/25/17  0800   WBC  --  6.5  --   --   --  7.5 7.4   HGB  --  11.0*  --   --   --  11.5* 13.1*   MCV  --  97  --   --   --  98 99   PLT  --  183  --   --   --  135* 125*    139  --  135  --  136 135   POTASSIUM 3.4 3.4 3.7 3.2*  < > 3.2* 3.7   CHLORIDE 104 106  --  102  --  104 102   CO2 29 25  --  23  --  21 25   BUN 4* 4*  --  6*  --  8 9   CR 0.96 0.85  --  0.75  --  0.74 0.85   ANIONGAP 7 8  --  10  --  11 8   ZAIRA 8.9 8.1*  --  8.4*  --  8.1* 8.3*   * 93  --  72  --  65* 70   ALBUMIN  --  2.5*  --   --   --  2.4* 2.8*   PROTTOTAL  --  6.6*  --   --   --  6.5* 7.2   BILITOTAL  --  0.8  --   --   --  1.0 1.1   ALKPHOS  --   99  --   --   --  97 97   ALT  --  62  --   --   --  58 70   AST  --  82*  --   --   --  89* 83*   LIPASE 666* 722*  --  1077*  --  590* 472*   < > = values in this interval not displayed.

## 2017-07-30 NOTE — PLAN OF CARE
Problem: Goal Outcome Summary  Goal: Goal Outcome Summary  Outcome: No Change  T max 99.8, prn tylenol, recheck 98.2. PRN oxy 10 mg x2 for pain. Ativan x2 for anxiety. Miralax x1 w/o results. Tolerating low fat diet. Up ind. Mother at bedside.

## 2017-07-30 NOTE — PLAN OF CARE
Problem: Goal Outcome Summary  Goal: Goal Outcome Summary  Vitals stable, prn oxycodone 10 mg and ativan 0.5 mg given for pain/anxiety. Continues to have abdominal and back pain. Monitoring labs, lipase trending down. Bowel sounds present and passing flatus, had miralax last evening since patient reports feeling a bit constipated.

## 2017-07-31 VITALS
HEART RATE: 99 BPM | RESPIRATION RATE: 16 BRPM | SYSTOLIC BLOOD PRESSURE: 135 MMHG | HEIGHT: 72 IN | DIASTOLIC BLOOD PRESSURE: 71 MMHG | OXYGEN SATURATION: 98 % | BODY MASS INDEX: 26.01 KG/M2 | WEIGHT: 192 LBS | TEMPERATURE: 99.2 F

## 2017-07-31 LAB
ANION GAP SERPL CALCULATED.3IONS-SCNC: 6 MMOL/L (ref 3–14)
BUN SERPL-MCNC: 8 MG/DL (ref 7–30)
CALCIUM SERPL-MCNC: 8.7 MG/DL (ref 8.5–10.1)
CHLORIDE SERPL-SCNC: 103 MMOL/L (ref 94–109)
CO2 SERPL-SCNC: 28 MMOL/L (ref 20–32)
CREAT SERPL-MCNC: 1.14 MG/DL (ref 0.66–1.25)
GFR SERPL CREATININE-BSD FRML MDRD: 73 ML/MIN/1.7M2
GLUCOSE SERPL-MCNC: 95 MG/DL (ref 70–99)
LIPASE SERPL-CCNC: 639 U/L (ref 73–393)
POTASSIUM SERPL-SCNC: 3.9 MMOL/L (ref 3.4–5.3)
SODIUM SERPL-SCNC: 137 MMOL/L (ref 133–144)

## 2017-07-31 PROCEDURE — 25000132 ZZH RX MED GY IP 250 OP 250 PS 637: Performed by: INTERNAL MEDICINE

## 2017-07-31 PROCEDURE — 25000132 ZZH RX MED GY IP 250 OP 250 PS 637

## 2017-07-31 PROCEDURE — 80048 BASIC METABOLIC PNL TOTAL CA: CPT | Performed by: INTERNAL MEDICINE

## 2017-07-31 PROCEDURE — 25000125 ZZHC RX 250: Performed by: INTERNAL MEDICINE

## 2017-07-31 PROCEDURE — 83690 ASSAY OF LIPASE: CPT | Performed by: INTERNAL MEDICINE

## 2017-07-31 PROCEDURE — 99239 HOSP IP/OBS DSCHRG MGMT >30: CPT | Performed by: INTERNAL MEDICINE

## 2017-07-31 PROCEDURE — 25000132 ZZH RX MED GY IP 250 OP 250 PS 637: Performed by: HOSPITALIST

## 2017-07-31 PROCEDURE — 36415 COLL VENOUS BLD VENIPUNCTURE: CPT | Performed by: INTERNAL MEDICINE

## 2017-07-31 RX ORDER — HYDROXYZINE HYDROCHLORIDE 25 MG/1
25 TABLET, FILM COATED ORAL EVERY 6 HOURS PRN
Qty: 30 TABLET | Refills: 0 | Status: SHIPPED | OUTPATIENT
Start: 2017-07-31

## 2017-07-31 RX ORDER — HYDROXYZINE HYDROCHLORIDE 25 MG/1
50 TABLET, FILM COATED ORAL EVERY 6 HOURS PRN
Status: DISCONTINUED | OUTPATIENT
Start: 2017-07-31 | End: 2017-07-31 | Stop reason: HOSPADM

## 2017-07-31 RX ORDER — QUETIAPINE FUMARATE 25 MG/1
25 TABLET, FILM COATED ORAL
Qty: 14 TABLET | Refills: 0 | Status: SHIPPED | OUTPATIENT
Start: 2017-07-31

## 2017-07-31 RX ORDER — POLYETHYLENE GLYCOL 3350 17 G/17G
17 POWDER, FOR SOLUTION ORAL 2 TIMES DAILY PRN
Qty: 14 PACKET | Refills: 0 | Status: SHIPPED | OUTPATIENT
Start: 2017-07-31

## 2017-07-31 RX ORDER — OXYCODONE HYDROCHLORIDE 5 MG/1
5-10 TABLET ORAL EVERY 4 HOURS PRN
Qty: 15 TABLET | Refills: 0 | Status: SHIPPED | OUTPATIENT
Start: 2017-07-31

## 2017-07-31 RX ORDER — HYDROMORPHONE HYDROCHLORIDE 1 MG/ML
.3-.5 INJECTION, SOLUTION INTRAMUSCULAR; INTRAVENOUS; SUBCUTANEOUS
Status: DISCONTINUED | OUTPATIENT
Start: 2017-07-31 | End: 2017-07-31 | Stop reason: HOSPADM

## 2017-07-31 RX ORDER — METOPROLOL TARTRATE 50 MG
25 TABLET ORAL 2 TIMES DAILY
Qty: 60 TABLET | Refills: 0 | Status: SHIPPED | OUTPATIENT
Start: 2017-07-31

## 2017-07-31 RX ORDER — HYDROXYZINE HYDROCHLORIDE 25 MG/1
25 TABLET, FILM COATED ORAL EVERY 6 HOURS PRN
Status: DISCONTINUED | OUTPATIENT
Start: 2017-07-31 | End: 2017-07-31 | Stop reason: HOSPADM

## 2017-07-31 RX ORDER — OXYCODONE HYDROCHLORIDE 5 MG/1
5-10 TABLET ORAL EVERY 4 HOURS PRN
Status: DISCONTINUED | OUTPATIENT
Start: 2017-07-31 | End: 2017-07-31 | Stop reason: HOSPADM

## 2017-07-31 RX ADMIN — LORAZEPAM 0.5 MG: 0.5 TABLET ORAL at 12:42

## 2017-07-31 RX ADMIN — MULTIPLE VITAMINS W/ MINERALS TAB 1 TABLET: TAB at 08:57

## 2017-07-31 RX ADMIN — METOPROLOL TARTRATE 50 MG: 50 TABLET, FILM COATED ORAL at 08:57

## 2017-07-31 RX ADMIN — AMLODIPINE BESYLATE 5 MG: 5 TABLET ORAL at 08:58

## 2017-07-31 RX ADMIN — LORAZEPAM 0.5 MG: 0.5 TABLET ORAL at 02:10

## 2017-07-31 RX ADMIN — OXYCODONE HYDROCHLORIDE 10 MG: 5 TABLET ORAL at 11:50

## 2017-07-31 RX ADMIN — LORAZEPAM 0.5 MG: 0.5 TABLET ORAL at 08:57

## 2017-07-31 RX ADMIN — OXYCODONE HYDROCHLORIDE 10 MG: 5 TABLET ORAL at 07:48

## 2017-07-31 RX ADMIN — FOLIC ACID 1 MG: 1 TABLET ORAL at 08:57

## 2017-07-31 RX ADMIN — OXYCODONE HYDROCHLORIDE 10 MG: 5 TABLET ORAL at 02:10

## 2017-07-31 RX ADMIN — RANITIDINE 150 MG: 150 TABLET ORAL at 08:57

## 2017-07-31 RX ADMIN — BACITRACIN: 500 OINTMENT TOPICAL at 08:58

## 2017-07-31 NOTE — PLAN OF CARE
Problem: Goal Outcome Summary  Goal: Goal Outcome Summary  Outcome: No Change  VSS. A/Ox4. HR reg. Lungs clear. C/o back pain. PRN oxy given x2. Up independent. No IV. D/C tomorrow.

## 2017-07-31 NOTE — PLAN OF CARE
Problem: Goal Outcome Summary  Goal: Goal Outcome Summary  Outcome: Adequate for Discharge Date Met:  07/31/17  Patient discharged at 1345 with all of his belongings and prescribed medications. Discussed patient discharge paperwork with patient and he showed understanding. All questions discussed with patient. VSS. Assessment was stable with no concerns at this time. Mom providing transport home.

## 2017-07-31 NOTE — PROGRESS NOTES
[facility]    Hospitalist Progress Note    Date of Service (when I saw the patient): 07/30/2017  Provider:  Carmelo Washington MD     Initial presenting complaint/issue to hospital (Diagnosis): Abdominal pain, nausea and vomiting.    Assessment & Plan   Yovani Servin is a 36 year old male who presented on 7/23/17 with excruciating epigastric pain, nausea and vomiting. He was found to have an elevated lipase and elevated BAL on admission (0.10 g/dL) abnormal liver enzymes consistent with alcoholic hepatitis. Imaging confirmed both findings consistent with acute pancreatitis and severe steatosis.     Following admission, the patient improved with conservative measures and had little to no withdrawal symptoms. He ate some on 7/25 and with that, seemed to take a few steps back in terms of his recovery. He is now symptomatically improved (no N/V, but continues with pain radiating through to his back). He is impatient to start eating and feels that he is extremely anxious.     Unfortunately, he is also directive with regard to his care. He had Psychiatric evaluation today and seems to endorse a willingness to try to stop drinking. He also appears to have some insight into his motivation for drinking (treatment of anxiety disorder).     1.  Acute alcoholic pancreatitis.  - Leukocytosis, resolved. Mild thrombocytopenia is improving; ANC now normal; Hgb remains mildly low, but relatively stable.   - tolerating low fat diet without apparent pancreatic irritation.  - Moderate dehydration, resolved  2.  Tachypnea secondary to #2. Resolved  3.  Acute alcoholic hepatitis with alcoholic steatosis.  Resolving.  4.  At risk for CHEKO. Did not require any Benzos for true withdrawal syndrome. CIWA stopped; Ativan for anxiety.  5.  Persistent sinus tachycardia. Appears likely to be baseline.  6.  Hypertension. Denies prior history. Somewhat better with oral Metoprolol and Amlodipine.  7.  Mood disorder. Pt freely admits  that he suffers from anxiety and has been managing it with alcohol intake. He has been reluctant to take medications because his father became suicidal when he was on Prozac. Pt is from [place] and was advised to follow up with a psychiatrist at home in order to initiate therapy for Anxiety/depression. Offer Lorazepam orally for anxiety as well as Quetiapine for HS. Encouraged Quetiapine.   8. Multifactorial pain. Pt notes that he has some new pain in the back. Pt now tolerating Oxycodone for pain control.        Plan.  Tolerating low fat diet.   Oxycodone 10-15 mg q 4 hours prn.   Treatment of nausea and vomiting per protocol.  Ranitidine po bid.  Off IVF.  Metoprolol to 50 mg BID oral and Amlodipine 5 mg daily.  Lipase, BMP, CBC in a.m.    DVT Prophylaxis: Pneumatic Compression Devices  Code Status: Full Code    Disposition: Expected discharge tomorrow. I had discussed with pt the likelihood of discharging tomorrow (as he no longer is requiring IV support). Anticipate he will go home with enough pain medication for about 3-5 days (about 30 mg daily of Oxycodone and about 2-3 doses daily of Lorazepam). I have discussed with pt (and his mother who is almost always at bedside) risks associated with use of benzos in combo with narcotics.     Pt may need some real encouragement to discharge.    Interval History   Again I asked for pt to try Quetiapine for anxiety and sleep.     No N/V. Pain borderline controlled with oxycodone 5-10 mg q 4 hours.   Still complaining of discomfort. Eating however and sleeping poorly.    -Data reviewed today: I reviewed all new labs and imaging results over the last 24 hours.     Physical Exam   Temp: 97.2  F (36.2  C) Temp src: Oral BP: 129/78 Pulse: 99 Heart Rate: 103 Resp: 16 SpO2: 96 % O2 Device: None (Room air)    Vitals:    07/23/17 1318 07/23/17 1355   Weight: 90.7 kg (200 lb) 87.1 kg (192 lb)     Vital Signs with Ranges  Temp:  [97.2  F (36.2  C)-99.8  F (37.7  C)] 97.2  F (36.2   C)  Pulse:  [99] 99  Heart Rate:  [103-105] 103  Resp:  [16] 16  BP: (128-140)/(78-90) 129/78  SpO2:  [96 %-97 %] 96 %  I/O last 3 completed shifts:  In: 200 [P.O.:200]  Out: -     GEN:  Alert, oriented x 3, appears comfortable, NAD.  HEENT:  Normocephalic/atraumatic, no scleral icterus, no nasal discharge, mouth moist.  CV:  Regular tachycardia, sinus in monitor, no murmur or JVD.  S1 + S2 noted, no S3 or S4.  LUNGS:  Clear to auscultation bilaterally without rales/rhonchi/wheezing/retractions.  Symmetric chest rise on inhalation noted.  ABD: decreased bowel sounds, mildly tender in mid upper/non-distended. No guarding/not rigid, but appears to resist exam. Rebound not evident.   EXT:  No edema or cyanosis.  No joint synovitis noted. Mildly tender over the left upper lumbar/lower thoracic paraspinous muscles.   SKIN:  Dry to touch, no exanthems noted in the visualized areas.  NEURO: appropriate. No tremor.    Medications        bacitracin   Topical BID     metoprolol  50 mg Oral BID     ranitidine  150 mg Oral BID     amLODIPine  5 mg Oral Daily     folic acid  1 mg Oral Daily     multivitamin, therapeutic with minerals  1 tablet Oral Daily     sodium chloride (PF)  3 mL Intravenous Q8H       Data     Recent Labs  Lab 07/30/17  0807 07/29/17  0629 07/28/17  0633  07/26/17  0602   WBC 6.8  --  6.5  --  7.5   HGB 11.7*  --  11.0*  --  11.5*   MCV 99  --  97  --  98     --  183  --  135*    140 139  < > 136   POTASSIUM 3.5 3.4 3.4  < > 3.2*   CHLORIDE 103 104 106  < > 104   CO2 30 29 25  < > 21   BUN 6* 4* 4*  < > 8   CR 1.08 0.96 0.85  < > 0.74   ANIONGAP 6 7 8  < > 11   ZAIRA 8.6 8.9 8.1*  < > 8.1*   * 100* 93  < > 65*   ALBUMIN 2.8*  --  2.5*  --  2.4*   PROTTOTAL 7.3  --  6.6*  --  6.5*   BILITOTAL 0.8  --  0.8  --  1.0   ALKPHOS 118  --  99  --  97   ALT 73*  --  62  --  58   *  --  82*  --  89*   LIPASE 661* 666* 722*  < > 590*   < > = values in this interval not displayed.

## 2017-07-31 NOTE — DISCHARGE SUMMARY
[facility]  Discharge Summary  Name: Yovani Servin    MRN: [id number]  YOB: 1980    Age: 36 year old  Date of Discharge:  7/31/2017  Date of Admission: 7/23/2017  Primary Care Provider: Clifton Perez, Md  Discharge Physician:  Rajesh Douglas MD  Discharging Service:  Hospitalist      Discharge Diagnoses:  1.  Abdominal pain found to have Acute alcoholic pancreatitis with hepatitis.  Resolving.  Needs close outpatient follow up for repeat lipase, LFTs, and if pain not resolved of lab abnormalities remain prominent consider repeating CT scan of his abdomen to evaluate for complication such as cyst or abscess.  Given 15 tabs total of 5 mg oxycodone with extensive discussions re: risks of this medication and the need to taper off very quickly.  Certainly if he is still having pain to the point that he needs narcotics moving forward he will require urgent re-evaluation in urgent care or the ER and he expresses good understanding of this.    2.  Alcohol abuse with interest in sobriety: outpatient counseling/treatment recommended.  Minimal withdrawal symptoms here.    3.  Long standing anxiety: started on QHS seroquel by my colleague.  Given comorbid etoh abuse would like to avoid further ativan to reduce risk of dependence.  Will add prn hydroxyzine and I've emphasized the need for close follow up to re-evaluate.      4.  Tachypnea and tachycardia: resolved.  Suspect related to SIRS from acute illness.      5.  HTN: normalizing.  No prior diagnosis of this per his report so this may have been in part due to pain/withdrawal.  Will stop amlodipine and start to taper metoprolol from 50 mg BID to 25 mg BID.  Consider tapering further/off in clinic.    6.  Back pain: clinically has MSK pain likely from muscle spasm.  Likely worse due to immobility while ill.  Mobilize as able.      7.  Abdominal pain: due to #1.  Also may have a component of etoh gastritis.  Continue ranitidine for  now, though this all seems markedly improved.       Hospital Course:  Yovani Servin is a 36 year old male who presented on 7/23/17 with excruciating epigastric pain, nausea and vomiting. He was found to have an elevated lipase and elevated BAL on admission (0.10 g/dL) abnormal liver enzymes consistent with alcoholic hepatitis. Imaging confirmed both findings consistent with acute pancreatitis and severe steatosis without any obvious structural abnormality.      Following admission, the patient reportedly had improved with conservative measures and had little to no withdrawal symptoms. He ate some on 7/25 and with that, reportedly seemed to take a few steps back in terms of his recovery. He is now symptomatically improved.    I assumed his care today and at this point he feels he can tolerate a low fat diet and in fact his IV had been discontinued.  I had a long discussion with both he and his mother (who has been present frequently throughout his stay and seems to be supportive) about his longstanding history of untreated anxiety (for which he medicates with etoh) and the risks associated with controlled substances such as narcotics and benzodiazepines.  They are already working on finding a good primary clinic back in [place] and he expresses good understanding of the need to follow up with outpatient psychiatry as well.  (He was seen by psychiatry here as well).      Ultimately, given risks of benzos we have elected to avoid those for now and continue with a trial of low dose seroquel at night as poor sleep and racing thoughts have been a prominent issue for him.  He will also try hydroxyzine as needed.  He will follow up in clinic as noted above for close re-evaluation of his abdominal symptoms and lab abnormalities and continue with his plan for absolute sobriety.     Discharge Disposition:  Discharged to home     Allergies:  Allergies   Allergen Reactions     Morphine         Discharge Medications:    Current Discharge Medication List      START taking these medications    Details   oxyCODONE (ROXICODONE) 5 MG IR tablet Take 1-2 tablets (5-10 mg) by mouth every 4 hours as needed for severe pain  Qty: 15 tablet, Refills: 0    Associated Diagnoses: Acute pancreatitis without infection or necrosis, unspecified pancreatitis type      hydrOXYzine (ATARAX) 25 MG tablet Take 1 tablet (25 mg) by mouth every 6 hours as needed for other (adjuvant pain)  Qty: 30 tablet, Refills: 0    Associated Diagnoses: Adjustment disorder with anxious mood      QUEtiapine (SEROQUEL) 25 MG tablet Take 1 tablet (25 mg) by mouth nightly as needed (insomnia/anxiety)  Qty: 14 tablet, Refills: 0    Associated Diagnoses: TATYANA (generalized anxiety disorder)      metoprolol (LOPRESSOR) 50 MG tablet Take 0.5 tablets (25 mg) by mouth 2 times daily  Qty: 60 tablet, Refills: 0    Associated Diagnoses: Essential hypertension, benign      polyethylene glycol (MIRALAX/GLYCOLAX) Packet Take 17 g by mouth 2 times daily as needed (constipation)  Qty: 14 packet, Refills: 0    Associated Diagnoses: Drug-induced constipation      ranitidine (ZANTAC) 150 MG tablet Take 1 tablet (150 mg) by mouth 2 times daily  Qty: 60 tablet, Refills: 0    Associated Diagnoses: Abdominal pain, epigastric         CONTINUE these medications which have NOT CHANGED    Details   melatonin 5 MG tablet Take 10 mg by mouth nightly as needed for sleep         STOP taking these medications       ibuprofen (ADVIL) 200 MG tablet Comments:   Reason for Stopping:                Condition on Discharge:  Discharge condition: Stable   Discharge vitals: Blood pressure 135/71, pulse 99, temperature 99.2  F (37.3  C), temperature source Oral, resp. rate 16, height 1.829 m (6'), weight 87.1 kg (192 lb), SpO2 98 %.   Code status on discharge: Full Code     History of Illness:  See detailed admission note for full details.    Physical Exam:  Blood pressure 135/71, pulse 99, temperature 99.2  F  (37.3  C), temperature source Oral, resp. rate 16, height 1.829 m (6'), weight 87.1 kg (192 lb), SpO2 98 %.  Wt Readings from Last 1 Encounters:   07/23/17 87.1 kg (192 lb)     General: Alert, awake, no acute distress.  HEENT: NC/AT, eyes anicteric, external occular movements intact, face symmetric.  Dentition WNL, MM moist.  Cardiac: RRR, S1, S2.  No murmurs appreciated.  Pulmonary: Normal chest rise, normal work of breathing.  Lungs CTA BL  Abdomen: soft, mildly ttp in epigastrium but otherwise non-tender, non-distended.  Bowel Sounds Present.  No guarding.  Extremities: no deformities.  Warm, well perfused.  Back: mild ttp of josé miguel-spinal musculature.    Skin: no rashes or lesions noted.  Warm and Dry.  Neuro: No focal deficits noted.  Speech clear.  Coordination and strength grossly normal.  Psych: Appropriate affect.    Procedures other than Imaging:  None.     Imaging:  Results for orders placed or performed during the hospital encounter of 07/23/17   Abdomen US, limited (RUQ only)    Narrative    ULTRASOUND ABDOMEN LIMITED July 23, 2017 10:18 AM     HISTORY: Upper abdominal pain; abnormal liver function tests.    FINDINGS: No cholelithiasis or apparent gallbladder wall thickening.  There is no apparent biliary dilatation. There is an 11.9 x 4.1 x 4.1  cm fluid collection at the midline of the upper abdomen of  indeterminate etiology or significance. Increased hepatic echotexture  suggests fatty infiltration. The right kidney appears within normal  limits. The pancreas is completely obscured by bowel gas.      Impression    IMPRESSION: No cholelithiasis or biliary dilatation. Midline abdominal  fluid collection of indeterminate etiology or significance.    DENISE ELIAS MD   CT Abdomen Pelvis w Contrast    Narrative    CT ABDOMEN/PELVIS WITH CONTRAST July 23, 2017 11:10 AM     HISTORY: Pancreatitis; abnormal fluid collection on ultrasound.    CONTRAST DOSE: 100mL Isovue-370.    TECHNIQUE: Radiation dose for  this scan was reduced using automated  exposure control, adjustment of the mA and/or kV according to patient  size, or iterative reconstruction technique.    FINDINGS: There is severe hepatic fatty infiltration. Peripancreatic  stranding is noted adjacent to the pancreatic head. Hypodensity is  also noted within the pancreatic head. The adjacent portion of the  duodenum may be thickened. No discrete soft tissue fluid collection is  visible. The spleen, gallbladder, adrenal glands, and kidneys appear  within normal limits. Stranding is noted along the right paracolic  gutter. There is no evidence of bowel obstruction. No free peritoneal  fluid or air. Pelvic contents appear within normal limits.      Impression    IMPRESSION:  1. Peripancreatic and right paracolic stranding as well as hypodensity  within the pancreatic head. These findings suggest pancreatitis.  2. Severe hepatic fatty infiltration.    DENISE ELIAS MD        Consultations:  Psychiatry consult.       Recent Lab Results:    Recent Labs  Lab 07/30/17  0807 07/28/17 0633 07/26/17  0602   WBC 6.8 6.5 7.5   HGB 11.7* 11.0* 11.5*   HCT 35.7* 32.7* 34.2*   MCV 99 97 98    183 135*          Lab Results   Component Value Date     07/31/2017     07/30/2017     07/29/2017    Lab Results   Component Value Date    CHLORIDE 103 07/31/2017    CHLORIDE 103 07/30/2017    CHLORIDE 104 07/29/2017    Lab Results   Component Value Date    BUN 8 07/31/2017    BUN 6 07/30/2017    BUN 4 07/29/2017      Lab Results   Component Value Date    POTASSIUM 3.9 07/31/2017    POTASSIUM 3.5 07/30/2017    POTASSIUM 3.4 07/29/2017    Lab Results   Component Value Date    CO2 28 07/31/2017    CO2 30 07/30/2017    CO2 29 07/29/2017    Lab Results   Component Value Date    CR 1.14 07/31/2017    CR 1.08 07/30/2017    CR 0.96 07/29/2017          Recent Labs  Lab 07/30/17  0807 07/28/17  0633 07/26/17  0602   * 82* 89*   ALT 73* 62 58   ALKPHOS 118 99 97    BILITOTAL 0.8 0.8 1.0     No results for input(s): INR in the last 168 hours.    Recent Labs  Lab 07/31/17  0631 07/30/17  0807 07/29/17  0629   LIPASE 639* 661* 666*            Pending Results:  Unresulted Labs Ordered in the Past 30 Days of this Admission     No orders found from 5/24/2017 to 7/24/2017.           Discharge Instructions and Follow-Up:     Discharge Procedure Orders  Reason for your hospital stay   Order Comments: You were admitted for acute pancreatitis due to alcohol abuse.  As discussed, you were treated with IV fluids and pain medication.  You now have been advanced to a low fat diet and we recommend you eat 5-6 small meals per day and maintain good hydration.     Follow-up and recommended labs and tests    Order Comments: Follow up with primary care provider within 3-5 days for hospital follow- up.  The following labs/tests are recommended:   1) Re-evaluate your abdominal pain/pancreatitis.  As discussed, if this is not resolved you should have a repeat CT scan to evaluate for any complications.    2) Re-check your blood pressure and discuss whether tapering off of the metoprolol would be reasonable.    3) Discuss anxiety management and efficacy of your current medications.    4) Establish primary care.    5)  Recheck your lipase and liver function tests.     Activity   Order Comments: Your activity upon discharge: light activity as tolerated until re-evaluated by your new primary care clinic.   Order Specific Question Answer Comments   Is discharge order? Yes      Full Code     Diet   Order Comments: Follow this diet upon discharge: Orders Placed This Encounter       Low Fat Diet, 5-6 small feedings per day.  Absolutely no alcohol.   Order Specific Question Answer Comments   Is discharge order? Yes          Total time spent in face to face contact with the patient and coordinating discharge was:  60 Minutes.

## 2017-07-31 NOTE — PLAN OF CARE
Problem: Goal Outcome Summary  Goal: Goal Outcome Summary  Vitals stable, oxycodone and ativan given for pain control/anxiety. Plan is for discharge today, wishes to have earlier discharge since patient has a flight home around 1600.

## 2017-07-31 NOTE — PROGRESS NOTES
SPIRITUAL HEALTH SERVICES Progress Note  [facility] Med. Surg. 5    Visited pt Yovani per his length of stay.  His mother Alejandra was present.  Oriented them to  services.  Yovani reported that he is being treated for Etoh related pancreatitis and explained that he was drinking to self-medicate his anxiety and depression.  He stated that he will discharg today and will begin to head home, which is outside of [place].  Yovani shared that he is a [employer] and requested prayer, which I offered.  No other needs were expressed.  No further plans; I and other chaplains remain available per pt/family request.    Bib Lay M.Div., [facility]  Staff   Pager [phone]

## 2017-08-03 NOTE — PROGRESS NOTES
Pt called [phone] and said he has run out of pain medications.  He is back in [place] and is unable to get into a primary care physician not until Monday.  I called [facility] [phone] to inquire about getting an earlier appt, but they said their MD is out until Monday.  She recommended the clinic [facility] [phone].  I called Yovani back and gave him the info to call to get an appt.  He will call back if there are further concerns.  Reena RN CTS [phone]    Pt said they also can't fit him in till Monday.  I called scheduling and discussed the situation with Tessie Pratt.  She is willing to call him and try to fit him in.  I faxed info to [phone]

## 2020-11-17 NOTE — IP AVS SNAPSHOT
[place] [address] [facility]    [address]    [facility] [address]    Phone:  [phone]    Fax:  [phone]                                       After Visit Summary   7/23/2017    Yovani Servin    MRN: [id number]           After Visit Summary Signature Page     I have received my discharge instructions, and my questions have been answered. I have discussed any challenges I see with this plan with the nurse or doctor.    ..........................................................................................................................................  Patient/Patient Representative Signature      ..........................................................................................................................................  Patient Representative Print Name and Relationship to Patient    ..................................................               ................................................  Date                                            Time    ..........................................................................................................................................  Reviewed by Signature/Title    ...................................................              ..............................................  Date                                                            Time           Patient was following up on the work note. Please call patient back to further assist.

## 2021-12-31 NOTE — PLAN OF CARE
Problem: Goal Outcome Summary  Goal: Goal Outcome Summary  Outcome: Improving  Temp max 100.3- recheck 99.5. Other vitals stable.  Abdominal and back pain relieved with PRN oxycodone. PCA D/C'd.   Denies nausea. Advanced to no fat diet. BS active, +flatus.  Tolerated small amount of diet.  Up independently, steady.       MOLECULAR PCR